# Patient Record
Sex: FEMALE | Race: WHITE | Employment: UNEMPLOYED | URBAN - METROPOLITAN AREA
[De-identification: names, ages, dates, MRNs, and addresses within clinical notes are randomized per-mention and may not be internally consistent; named-entity substitution may affect disease eponyms.]

---

## 2017-02-07 PROBLEM — M54.2 NECK PAIN: Chronic | Status: ACTIVE | Noted: 2017-02-07

## 2017-02-07 PROBLEM — G44.229 CHRONIC TENSION HEADACHE: Chronic | Status: ACTIVE | Noted: 2017-02-07

## 2017-02-07 PROBLEM — F41.9 ANXIETY: Chronic | Status: ACTIVE | Noted: 2017-02-07

## 2017-02-16 ENCOUNTER — HOSPITAL ENCOUNTER (OUTPATIENT)
Dept: PHYSICAL THERAPY | Age: 27
Discharge: HOME | End: 2017-02-16

## 2017-03-14 ENCOUNTER — HOSPITAL ENCOUNTER (OUTPATIENT)
Age: 27
Setting detail: OUTPATIENT SURGERY
End: 2017-03-14
Attending: PAIN MEDICINE | Admitting: PAIN MEDICINE
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
## 2017-05-18 ENCOUNTER — HOSPITAL ENCOUNTER (OUTPATIENT)
Dept: PHYSICAL THERAPY | Age: 27
Discharge: HOME | End: 2017-05-18

## 2022-08-25 ENCOUNTER — APPOINTMENT (RX ONLY)
Dept: URBAN - METROPOLITAN AREA CLINIC 24 | Facility: CLINIC | Age: 32
Setting detail: DERMATOLOGY
End: 2022-08-25

## 2022-08-25 DIAGNOSIS — Z80.8 FAMILY HISTORY OF MALIGNANT NEOPLASM OF OTHER ORGANS OR SYSTEMS: ICD-10-CM

## 2022-08-25 DIAGNOSIS — Z71.89 OTHER SPECIFIED COUNSELING: ICD-10-CM

## 2022-08-25 DIAGNOSIS — D22 MELANOCYTIC NEVI: ICD-10-CM

## 2022-08-25 DIAGNOSIS — L82.1 OTHER SEBORRHEIC KERATOSIS: ICD-10-CM

## 2022-08-25 DIAGNOSIS — Z87.2 PERSONAL HISTORY OF DISEASES OF THE SKIN AND SUBCUTANEOUS TISSUE: ICD-10-CM

## 2022-08-25 DIAGNOSIS — L57.8 OTHER SKIN CHANGES DUE TO CHRONIC EXPOSURE TO NONIONIZING RADIATION: ICD-10-CM

## 2022-08-25 PROBLEM — D22.5 MELANOCYTIC NEVI OF TRUNK: Status: ACTIVE | Noted: 2022-08-25

## 2022-08-25 PROBLEM — D22.72 MELANOCYTIC NEVI OF LEFT LOWER LIMB, INCLUDING HIP: Status: ACTIVE | Noted: 2022-08-25

## 2022-08-25 PROCEDURE — 11301 SHAVE SKIN LESION 0.6-1.0 CM: CPT

## 2022-08-25 PROCEDURE — ? MEDICAL PHOTOGRAPHY REVIEW

## 2022-08-25 PROCEDURE — ? SHAVE REMOVAL

## 2022-08-25 PROCEDURE — 99203 OFFICE O/P NEW LOW 30 MIN: CPT | Mod: 25

## 2022-08-25 PROCEDURE — ? SUNSCREEN RECOMMENDATIONS

## 2022-08-25 PROCEDURE — ? COUNSELING

## 2022-08-25 ASSESSMENT — LOCATION DETAILED DESCRIPTION DERM
LOCATION DETAILED: RIGHT RIB CAGE
LOCATION DETAILED: RIGHT LATERAL BREAST 7-8:00 REGION
LOCATION DETAILED: LEFT ANTERIOR PROXIMAL UPPER ARM
LOCATION DETAILED: LEFT MEDIAL SUPERIOR CHEST
LOCATION DETAILED: LEFT MEDIAL UPPER BACK
LOCATION DETAILED: LEFT POSTERIOR LATERAL DISTAL THIGH

## 2022-08-25 ASSESSMENT — LOCATION ZONE DERM
LOCATION ZONE: TRUNK
LOCATION ZONE: LEG
LOCATION ZONE: ARM

## 2022-08-25 ASSESSMENT — LOCATION SIMPLE DESCRIPTION DERM
LOCATION SIMPLE: LEFT THIGH
LOCATION SIMPLE: LEFT UPPER BACK
LOCATION SIMPLE: LEFT UPPER ARM
LOCATION SIMPLE: CHEST
LOCATION SIMPLE: ABDOMEN
LOCATION SIMPLE: RIGHT BREAST

## 2022-08-25 NOTE — PROCEDURE: MEDICAL PHOTOGRAPHY REVIEW
Detail Level: Detailed
Review Findings: a changing pigmented lesion (addressed during visit)
Number Of Photographs Reviewed: 1

## 2022-08-25 NOTE — PROCEDURE: SHAVE REMOVAL
Accession #: [id number]
Bill For Surgical Tray: no
Anesthesia Type: 1% lidocaine without epinephrine and a 1:12 solution of 8.4% sodium bicarbonate
Post-Care Instructions: I reviewed with the patient in detail post-care instructions. Patient is to keep the biopsy site dry overnight, and then apply bacitracin twice daily until healed. Patient may apply hydrogen peroxide soaks to remove any crusting.
Medical Necessity Clause: This procedure was medically necessary because the lesion that was treated was:
Was A Bandage Applied: Yes
Consent: Verbal consent was obtained from the patient. The risks and benefits to therapy were discussed in detail. Specifically, the risks of infection, scarring, bleeding, prolonged wound healing, incomplete removal, allergy to anesthesia, nerve injury and recurrence were addressed. Prior to the procedure, the treatment site was clearly identified and confirmed by the patient. All components of Universal Protocol/PAUSE Rule completed. Mervat RICE
X Size Of Lesion In Cm (Optional): 0
Anesthesia Volume In Cc: 0.8
Billing Type: Third-Party Bill
Wound Care: Petrolatum
Biopsy Method: Personna blade
Hemostasis: Drysol
Medical Necessity Information: It is in your best interest to select a reason for this procedure from the list below. All of these items fulfill various CMS LCD requirements except the new and changing color options.
Detail Level: Detailed
Notification Instructions: Patient will be notified of pathology results. However, patient instructed to call the office if not contacted within 2 weeks.
Size Of Lesion In Cm (Required): 0.7

## 2023-08-24 ENCOUNTER — APPOINTMENT (RX ONLY)
Dept: URBAN - METROPOLITAN AREA CLINIC 24 | Facility: CLINIC | Age: 33
Setting detail: DERMATOLOGY
End: 2023-08-24

## 2023-08-24 DIAGNOSIS — L57.8 OTHER SKIN CHANGES DUE TO CHRONIC EXPOSURE TO NONIONIZING RADIATION: ICD-10-CM

## 2023-08-24 DIAGNOSIS — D22 MELANOCYTIC NEVI: ICD-10-CM

## 2023-08-24 DIAGNOSIS — Z71.89 OTHER SPECIFIED COUNSELING: ICD-10-CM

## 2023-08-24 DIAGNOSIS — Z87.2 PERSONAL HISTORY OF DISEASES OF THE SKIN AND SUBCUTANEOUS TISSUE: ICD-10-CM

## 2023-08-24 DIAGNOSIS — Z80.8 FAMILY HISTORY OF MALIGNANT NEOPLASM OF OTHER ORGANS OR SYSTEMS: ICD-10-CM

## 2023-08-24 PROBLEM — D22.9 MELANOCYTIC NEVI, UNSPECIFIED: Status: ACTIVE | Noted: 2023-08-24

## 2023-08-24 PROBLEM — D22.5 MELANOCYTIC NEVI OF TRUNK: Status: ACTIVE | Noted: 2023-08-24

## 2023-08-24 PROCEDURE — 11301 SHAVE SKIN LESION 0.6-1.0 CM: CPT

## 2023-08-24 PROCEDURE — ? COUNSELING

## 2023-08-24 PROCEDURE — ? SHAVE REMOVAL

## 2023-08-24 PROCEDURE — 99213 OFFICE O/P EST LOW 20 MIN: CPT | Mod: 25

## 2023-08-24 PROCEDURE — ? SUNSCREEN RECOMMENDATIONS

## 2023-08-24 ASSESSMENT — LOCATION DETAILED DESCRIPTION DERM
LOCATION DETAILED: LEFT POSTERIOR LATERAL DISTAL THIGH
LOCATION DETAILED: STERNAL NOTCH
LOCATION DETAILED: LEFT POSTERIOR SHOULDER
LOCATION DETAILED: LEFT MEDIAL UPPER BACK
LOCATION DETAILED: LEFT ANTERIOR PROXIMAL UPPER ARM
LOCATION DETAILED: PERIUMBILICAL SKIN
LOCATION DETAILED: RIGHT POSTERIOR SHOULDER

## 2023-08-24 ASSESSMENT — LOCATION SIMPLE DESCRIPTION DERM
LOCATION SIMPLE: LEFT UPPER BACK
LOCATION SIMPLE: ABDOMEN
LOCATION SIMPLE: CHEST
LOCATION SIMPLE: LEFT SHOULDER
LOCATION SIMPLE: RIGHT SHOULDER
LOCATION SIMPLE: LEFT UPPER ARM
LOCATION SIMPLE: LEFT THIGH

## 2023-08-24 ASSESSMENT — LOCATION ZONE DERM
LOCATION ZONE: LEG
LOCATION ZONE: TRUNK
LOCATION ZONE: ARM

## 2023-08-24 NOTE — PROCEDURE: SHAVE REMOVAL
Medical Necessity Information: It is in your best interest to select a reason for this procedure from the list below. All of these items fulfill various CMS LCD requirements except the new and changing color options.
Medical Necessity Clause: This procedure was medically necessary because the lesion that was treated was:
Lab: [id number]
Lab Facility: [id number]
Detail Level: Detailed
Was A Bandage Applied: Yes
Size Of Lesion In Cm (Required): 0.6
X Size Of Lesion In Cm (Optional): 0
Depth Of Shave: dermis
Biopsy Method: double edge Personna blade
Anesthesia Type: 1% lidocaine without epinephrine and a 1:10 solution of 8.4% sodium bicarbonate
Anesthesia Volume In Cc: 0.4
Hemostasis: Aluminum Chloride
Wound Care: Petrolatum
Render Path Notes In Note?: No
Consent was obtained from the patient. The risks and benefits to therapy were discussed in detail. Specifically, the risks of infection, scarring, bleeding, prolonged wound healing, incomplete removal, allergy to anesthesia, nerve injury and recurrence were addressed. Prior to the procedure, the treatment site was clearly identified and confirmed by the patient. All components of Universal Protocol/PAUSE Rule completed.
Post-Care Instructions: I reviewed with the patient in detail post-care instructions. Patient is to keep the biopsy site dry overnight, and then apply bacitracin twice daily until healed. Patient may apply hydrogen peroxide soaks to remove any crusting.
Notification Instructions: Patient will be notified of pathology results. However, patient instructed to call the office if not contacted within 2 weeks.
Billing Type: Third-Party Bill

## 2023-09-26 ENCOUNTER — APPOINTMENT (RX ONLY)
Dept: URBAN - METROPOLITAN AREA CLINIC 24 | Facility: CLINIC | Age: 33
Setting detail: DERMATOLOGY
End: 2023-09-26

## 2023-09-26 DIAGNOSIS — D22 MELANOCYTIC NEVI: ICD-10-CM

## 2023-09-26 PROBLEM — D22.5 MELANOCYTIC NEVI OF TRUNK: Status: ACTIVE | Noted: 2023-09-26

## 2023-09-26 PROCEDURE — ? EXCISION

## 2023-09-26 PROCEDURE — 12032 INTMD RPR S/A/T/EXT 2.6-7.5: CPT

## 2023-09-26 PROCEDURE — 11402 EXC TR-EXT B9+MARG 1.1-2 CM: CPT

## 2023-09-26 ASSESSMENT — LOCATION DETAILED DESCRIPTION DERM: LOCATION DETAILED: PERIUMBILICAL SKIN

## 2023-09-26 ASSESSMENT — LOCATION ZONE DERM: LOCATION ZONE: TRUNK

## 2023-09-26 ASSESSMENT — LOCATION SIMPLE DESCRIPTION DERM: LOCATION SIMPLE: ABDOMEN

## 2023-09-26 NOTE — PROCEDURE: EXCISION
Medical Necessity Information: It is in your best interest to select a reason for this procedure from the list below. All of these items fulfill various CMS LCD requirements except lesion extends to a margin.
Include Z78.9 (Other Specified Conditions Influencing Health Status) As An Associated Diagnosis?: No
Medical Necessity Clause: This procedure was medically necessary because the lesion that was treated was:
Lab: [id number]
Lab Facility: [id number]
Size Of Lesion In Cm: 0.7
X Size Of Lesion In Cm (Optional): 0
Size Of Margin In Cm: 0.4
Eye Clamp Note Details: An eye clamp was used during the procedure.
Excision Method: Elliptical
Saucerization Depth: dermis and superficial adipose tissue
Repair Type: Intermediate
Intermediate / Complex Repair - Final Wound Length In Cm: 4.3
Suturegard Retention Suture: 2-0 Nylon
Retention Suture Bite Size: 3 mm
Length To Time In Minutes Device Was In Place: 10
Number Of Hemigard Strips Per Side: 1
Undermining Type: Entire Wound
Debridement Text: The wound edges were debrided prior to proceeding with the closure to facilitate wound healing.
Helical Rim Text: The closure involved the helical rim.
Vermilion Border Text: The closure involved the vermilion border.
Nostril Rim Text: The closure involved the nostril rim.
Retention Suture Text: Retention sutures were placed to support the closure and prevent dehiscence.
Suture Removal: 14 days
Epidermal Closure Graft Donor Site (Optional): simple interrupted
Graft Donor Site Bandage (Optional-Leave Blank If You Don't Want In Note): Steri-strips and a pressure bandage were applied to the donor site.
Detail Level: Detailed
Excision Depth: adipose tissue
Scalpel Size: 15 blade
Anesthesia Type: 1% lidocaine with epinephrine and a 1:10 solution of 8.4% sodium bicarbonate
Additional Anesthesia Volume In Cc: 6
Hemostasis: Electrocautery
Estimated Blood Loss (Cc): minimal
Repair Anesthesia Method: local infiltration
Deep Sutures: 4-0 Vicryl
Epidermal Sutures: 5-0 Prolene
Epidermal Closure: running
Wound Care: Waterproof dressing
Dressing: steri-strips and pressure dressing
Suturegard Intro: Intraoperative tissue expansion was performed, utilizing the SUTUREGARD device, in order to reduce wound tension.
Suturegard Body: The suture ends were repeatedly re-tightened and re-clamped to achieve the desired tissue expansion.
Hemigard Intro: Due to skin fragility and wound tension, it was decided to use HEMIGARD adhesive retention suture devices to permit a linear closure. The skin was cleaned and dried for a 6cm distance away from the wound. Excessive hair, if present, was removed to allow for adhesion.
Hemigard Postcare Instructions: The HEMIGARD strips are to remain completely dry for at least 5-7 days.
Positioning (Leave Blank If You Do Not Want): The patient was placed in a comfortable position exposing the surgical site.
Complex Repair Preamble Text (Leave Blank If You Do Not Want): Extensive wide undermining was performed.
Intermediate Repair Preamble Text (Leave Blank If You Do Not Want): Undermining was performed with blunt dissection.
Fusiform Excision Additional Text (Leave Blank If You Do Not Want): The margin was drawn around the clinically apparent lesion.  A fusiform shape was then drawn on the skin incorporating the lesion and margins.  Incisions were then made along these lines to the appropriate tissue plane and the lesion was extirpated.
Eliptical Excision Additional Text (Leave Blank If You Do Not Want): The margin was drawn around the clinically apparent lesion.  An elliptical shape was then drawn on the skin incorporating the lesion and margins.  Incisions were then made along these lines to the appropriate tissue plane and the lesion was extirpated.
Saucerization Excision Additional Text (Leave Blank If You Do Not Want): The margin was drawn around the clinically apparent lesion.  Incisions were then made along these lines, in a tangential fashion, to the appropriate tissue plane and the lesion was extirpated.
Slit Excision Additional Text (Leave Blank If You Do Not Want): A linear line was drawn on the skin overlying the lesion. An incision was made slowly until the lesion was visualized.  Once visualized, the lesion was removed with blunt dissection.
Excisional Biopsy Additional Text (Leave Blank If You Do Not Want): The margin was drawn around the clinically apparent lesion. An elliptical shape was then drawn on the skin incorporating the lesion and margins.  Incisions were then made along these lines to the appropriate tissue plane and the lesion was extirpated.
Perilesional Excision Additional Text (Leave Blank If You Do Not Want): The margin was drawn around the clinically apparent lesion. Incisions were then made along these lines to the appropriate tissue plane and the lesion was extirpated.
Repair Performed By Another Provider Text (Leave Blank If You Do Not Want): After the tissue was excised the defect was repaired by another provider.
No Repair - Repaired With Adjacent Surgical Defect Text (Leave Blank If You Do Not Want): After the excision the defect was repaired concurrently with another surgical defect which was in close approximation.
Adjacent Tissue Transfer Text: The defect edges were debeveled with a #15 scalpel blade.  Given the location of the defect and the proximity to free margins an adjacent tissue transfer was deemed most appropriate.  Using a sterile surgical marker, an appropriate flap was drawn incorporating the defect and placing the expected incisions within the relaxed skin tension lines where possible.    The area thus outlined was incised deep to adipose tissue with a #15 scalpel blade.  The skin margins were undermined to an appropriate distance in all directions utilizing iris scissors.
Advancement Flap (Single) Text: The defect edges were debeveled with a #15 scalpel blade.  Given the location of the defect and the proximity to free margins a single advancement flap was deemed most appropriate.  Using a sterile surgical marker, an appropriate advancement flap was drawn incorporating the defect and placing the expected incisions within the relaxed skin tension lines where possible.    The area thus outlined was incised deep to adipose tissue with a #15 scalpel blade.  The skin margins were undermined to an appropriate distance in all directions utilizing iris scissors.
Advancement Flap (Double) Text: The defect edges were debeveled with a #15 scalpel blade.  Given the location of the defect and the proximity to free margins a double advancement flap was deemed most appropriate.  Using a sterile surgical marker, the appropriate advancement flaps were drawn incorporating the defect and placing the expected incisions within the relaxed skin tension lines where possible.    The area thus outlined was incised deep to adipose tissue with a #15 scalpel blade.  The skin margins were undermined to an appropriate distance in all directions utilizing iris scissors.
Burow's Advancement Flap Text: The defect edges were debeveled with a #15 scalpel blade.  Given the location of the defect and the proximity to free margins a Burow's advancement flap was deemed most appropriate.  Using a sterile surgical marker, the appropriate advancement flap was drawn incorporating the defect and placing the expected incisions within the relaxed skin tension lines where possible.    The area thus outlined was incised deep to adipose tissue with a #15 scalpel blade.  The skin margins were undermined to an appropriate distance in all directions utilizing iris scissors.
Chonodrocutaneous Helical Advancement Flap Text: The defect edges were debeveled with a #15 scalpel blade.  Given the location of the defect and the proximity to free margins a chondrocutaneous helical advancement flap was deemed most appropriate.  Using a sterile surgical marker, the appropriate advancement flap was drawn incorporating the defect and placing the expected incisions within the relaxed skin tension lines where possible.    The area thus outlined was incised deep to adipose tissue with a #15 scalpel blade.  The skin margins were undermined to an appropriate distance in all directions utilizing iris scissors.
Crescentic Advancement Flap Text: The defect edges were debeveled with a #15 scalpel blade.  Given the location of the defect and the proximity to free margins a crescentic advancement flap was deemed most appropriate.  Using a sterile surgical marker, the appropriate advancement flap was drawn incorporating the defect and placing the expected incisions within the relaxed skin tension lines where possible.    The area thus outlined was incised deep to adipose tissue with a #15 scalpel blade.  The skin margins were undermined to an appropriate distance in all directions utilizing iris scissors.
A-T Advancement Flap Text: The defect edges were debeveled with a #15 scalpel blade.  Given the location of the defect, shape of the defect and the proximity to free margins an A-T advancement flap was deemed most appropriate.  Using a sterile surgical marker, an appropriate advancement flap was drawn incorporating the defect and placing the expected incisions within the relaxed skin tension lines where possible.    The area thus outlined was incised deep to adipose tissue with a #15 scalpel blade.  The skin margins were undermined to an appropriate distance in all directions utilizing iris scissors.
O-T Advancement Flap Text: The defect edges were debeveled with a #15 scalpel blade.  Given the location of the defect, shape of the defect and the proximity to free margins an O-T advancement flap was deemed most appropriate.  Using a sterile surgical marker, an appropriate advancement flap was drawn incorporating the defect and placing the expected incisions within the relaxed skin tension lines where possible.    The area thus outlined was incised deep to adipose tissue with a #15 scalpel blade.  The skin margins were undermined to an appropriate distance in all directions utilizing iris scissors.
O-L Flap Text: The defect edges were debeveled with a #15 scalpel blade.  Given the location of the defect, shape of the defect and the proximity to free margins an O-L flap was deemed most appropriate.  Using a sterile surgical marker, an appropriate advancement flap was drawn incorporating the defect and placing the expected incisions within the relaxed skin tension lines where possible.    The area thus outlined was incised deep to adipose tissue with a #15 scalpel blade.  The skin margins were undermined to an appropriate distance in all directions utilizing iris scissors.
O-Z Flap Text: The defect edges were debeveled with a #15 scalpel blade.  Given the location of the defect, shape of the defect and the proximity to free margins an O-Z flap was deemed most appropriate.  Using a sterile surgical marker, an appropriate transposition flap was drawn incorporating the defect and placing the expected incisions within the relaxed skin tension lines where possible. The area thus outlined was incised deep to adipose tissue with a #15 scalpel blade.  The skin margins were undermined to an appropriate distance in all directions utilizing iris scissors.
Double O-Z Flap Text: The defect edges were debeveled with a #15 scalpel blade.  Given the location of the defect, shape of the defect and the proximity to free margins a Double O-Z flap was deemed most appropriate.  Using a sterile surgical marker, an appropriate transposition flap was drawn incorporating the defect and placing the expected incisions within the relaxed skin tension lines where possible. The area thus outlined was incised deep to adipose tissue with a #15 scalpel blade.  The skin margins were undermined to an appropriate distance in all directions utilizing iris scissors.
V-Y Flap Text: The defect edges were debeveled with a #15 scalpel blade.  Given the location of the defect, shape of the defect and the proximity to free margins a V-Y flap was deemed most appropriate.  Using a sterile surgical marker, an appropriate advancement flap was drawn incorporating the defect and placing the expected incisions within the relaxed skin tension lines where possible.    The area thus outlined was incised deep to adipose tissue with a #15 scalpel blade.  The skin margins were undermined to an appropriate distance in all directions utilizing iris scissors.
Advancement-Rotation Flap Text: The defect edges were debeveled with a #15 scalpel blade.  Given the location of the defect, shape of the defect and the proximity to free margins an advancement-rotation flap was deemed most appropriate.  Using a sterile surgical marker, an appropriate flap was drawn incorporating the defect and placing the expected incisions within the relaxed skin tension lines where possible. The area thus outlined was incised deep to adipose tissue with a #15 scalpel blade.  The skin margins were undermined to an appropriate distance in all directions utilizing iris scissors.
Mercedes Flap Text: The defect edges were debeveled with a #15 scalpel blade.  Given the location of the defect, shape of the defect and the proximity to free margins a Mercedes flap was deemed most appropriate.  Using a sterile surgical marker, an appropriate advancement flap was drawn incorporating the defect and placing the expected incisions within the relaxed skin tension lines where possible. The area thus outlined was incised deep to adipose tissue with a #15 scalpel blade.  The skin margins were undermined to an appropriate distance in all directions utilizing iris scissors.
Modified Advancement Flap Text: The defect edges were debeveled with a #15 scalpel blade.  Given the location of the defect, shape of the defect and the proximity to free margins a modified advancement flap was deemed most appropriate.  Using a sterile surgical marker, an appropriate advancement flap was drawn incorporating the defect and placing the expected incisions within the relaxed skin tension lines where possible.    The area thus outlined was incised deep to adipose tissue with a #15 scalpel blade.  The skin margins were undermined to an appropriate distance in all directions utilizing iris scissors.
Mucosal Advancement Flap Text: Given the location of the defect, shape of the defect and the proximity to free margins a mucosal advancement flap was deemed most appropriate. Incisions were made with a 15 blade scalpel in the appropriate fashion along the cutaneous vermilion border and the mucosal lip. The remaining actinically damaged mucosal tissue was excised.  The mucosal advancement flap was then elevated to the gingival sulcus with care taken to preserve the neurovascular structures and advanced into the primary defect. Care was taken to ensure that precise realignment of the vermilion border was achieved.
Peng Advancement Flap Text: The defect edges were debeveled with a #15 scalpel blade.  Given the location of the defect, shape of the defect and the proximity to free margins a Peng advancement flap was deemed most appropriate.  Using a sterile surgical marker, an appropriate advancement flap was drawn incorporating the defect and placing the expected incisions within the relaxed skin tension lines where possible. The area thus outlined was incised deep to adipose tissue with a #15 scalpel blade.  The skin margins were undermined to an appropriate distance in all directions utilizing iris scissors.
Hatchet Flap Text: The defect edges were debeveled with a #15 scalpel blade.  Given the location of the defect, shape of the defect and the proximity to free margins a hatchet flap was deemed most appropriate.  Using a sterile surgical marker, an appropriate hatchet flap was drawn incorporating the defect and placing the expected incisions within the relaxed skin tension lines where possible.    The area thus outlined was incised deep to adipose tissue with a #15 scalpel blade.  The skin margins were undermined to an appropriate distance in all directions utilizing iris scissors.
Rotation Flap Text: The defect edges were debeveled with a #15 scalpel blade.  Given the location of the defect, shape of the defect and the proximity to free margins a rotation flap was deemed most appropriate.  Using a sterile surgical marker, an appropriate rotation flap was drawn incorporating the defect and placing the expected incisions within the relaxed skin tension lines where possible.    The area thus outlined was incised deep to adipose tissue with a #15 scalpel blade.  The skin margins were undermined to an appropriate distance in all directions utilizing iris scissors.
Bilateral Rotation Flap Text: The defect edges were debeveled with a #15 scalpel blade. Given the location of the defect, shape of the defect and the proximity to free margins a bilateral rotation flap was deemed most appropriate. Using a sterile surgical marker, an appropriate rotation flap was drawn incorporating the defect and placing the expected incisions within the relaxed skin tension lines where possible. The area thus outlined was incised deep to adipose tissue with a #15 scalpel blade. The skin margins were undermined to an appropriate distance in all directions utilizing iris scissors. Following this, the designed flap was carried over into the primary defect and sutured into place.
Spiral Flap Text: The defect edges were debeveled with a #15 scalpel blade.  Given the location of the defect, shape of the defect and the proximity to free margins a spiral flap was deemed most appropriate.  Using a sterile surgical marker, an appropriate rotation flap was drawn incorporating the defect and placing the expected incisions within the relaxed skin tension lines where possible. The area thus outlined was incised deep to adipose tissue with a #15 scalpel blade.  The skin margins were undermined to an appropriate distance in all directions utilizing iris scissors.
Staged Advancement Flap Text: The defect edges were debeveled with a #15 scalpel blade.  Given the location of the defect, shape of the defect and the proximity to free margins a staged advancement flap was deemed most appropriate.  Using a sterile surgical marker, an appropriate advancement flap was drawn incorporating the defect and placing the expected incisions within the relaxed skin tension lines where possible. The area thus outlined was incised deep to adipose tissue with a #15 scalpel blade.  The skin margins were undermined to an appropriate distance in all directions utilizing iris scissors.
Star Wedge Flap Text: The defect edges were debeveled with a #15 scalpel blade.  Given the location of the defect, shape of the defect and the proximity to free margins a star wedge flap was deemed most appropriate.  Using a sterile surgical marker, an appropriate rotation flap was drawn incorporating the defect and placing the expected incisions within the relaxed skin tension lines where possible. The area thus outlined was incised deep to adipose tissue with a #15 scalpel blade.  The skin margins were undermined to an appropriate distance in all directions utilizing iris scissors.
Transposition Flap Text: The defect edges were debeveled with a #15 scalpel blade.  Given the location of the defect and the proximity to free margins a transposition flap was deemed most appropriate.  Using a sterile surgical marker, an appropriate transposition flap was drawn incorporating the defect.    The area thus outlined was incised deep to adipose tissue with a #15 scalpel blade.  The skin margins were undermined to an appropriate distance in all directions utilizing iris scissors.
Muscle Hinge Flap Text: The defect edges were debeveled with a #15 scalpel blade.  Given the size, depth and location of the defect and the proximity to free margins a muscle hinge flap was deemed most appropriate.  Using a sterile surgical marker, an appropriate hinge flap was drawn incorporating the defect. The area thus outlined was incised with a #15 scalpel blade.  The skin margins were undermined to an appropriate distance in all directions utilizing iris scissors.
Mustarde Flap Text: The defect edges were debeveled with a #15 scalpel blade.  Given the size, depth and location of the defect and the proximity to free margins a Mustarde flap was deemed most appropriate.  Using a sterile surgical marker, an appropriate flap was drawn incorporating the defect. The area thus outlined was incised with a #15 scalpel blade.  The skin margins were undermined to an appropriate distance in all directions utilizing iris scissors.
Nasal Turnover Hinge Flap Text: The defect edges were debeveled with a #15 scalpel blade.  Given the size, depth, location of the defect and the defect being full thickness a nasal turnover hinge flap was deemed most appropriate.  Using a sterile surgical marker, an appropriate hinge flap was drawn incorporating the defect. The area thus outlined was incised with a #15 scalpel blade. The flap was designed to recreate the nasal mucosal lining and the alar rim. The skin margins were undermined to an appropriate distance in all directions utilizing iris scissors.
Nasalis-Muscle-Based Myocutaneous Island Pedicle Flap Text: Using a #15 blade, an incision was made around the donor flap to the level of the nasalis muscle. Wide lateral undermining was then performed in both the subcutaneous plane above the nasalis muscle, and in a submuscular plane just above periosteum. This allowed the formation of a free nasalis muscle axial pedicle (based on the angular artery) which was still attached to the actual cutaneous flap, increasing its mobility and vascular viability. Hemostasis was obtained with pinpoint electrocoagulation. The flap was mobilized into position and the pivotal anchor points positioned and stabilized with buried interrupted sutures. Subcutaneous and dermal tissues were closed in a multilayered fashion with sutures. Tissue redundancies were excised, and the epidermal edges were apposed without significant tension and sutured with sutures.
Orbicularis Oris Muscle Flap Text: The defect edges were debeveled with a #15 scalpel blade.  Given that the defect affected the competency of the oral sphincter an orbicularis oris muscle flap was deemed most appropriate to restore this competency and normal muscle function.  Using a sterile surgical marker, an appropriate flap was drawn incorporating the defect. The area thus outlined was incised with a #15 scalpel blade.
Melolabial Transposition Flap Text: The defect edges were debeveled with a #15 scalpel blade.  Given the location of the defect and the proximity to free margins a melolabial flap was deemed most appropriate.  Using a sterile surgical marker, an appropriate melolabial transposition flap was drawn incorporating the defect.    The area thus outlined was incised deep to adipose tissue with a #15 scalpel blade.  The skin margins were undermined to an appropriate distance in all directions utilizing iris scissors.
Rhombic Flap Text: The defect edges were debeveled with a #15 scalpel blade.  Given the location of the defect and the proximity to free margins a rhombic flap was deemed most appropriate.  Using a sterile surgical marker, an appropriate rhombic flap was drawn incorporating the defect.    The area thus outlined was incised deep to adipose tissue with a #15 scalpel blade.  The skin margins were undermined to an appropriate distance in all directions utilizing iris scissors.
Rhomboid Transposition Flap Text: The defect edges were debeveled with a #15 scalpel blade.  Given the location of the defect and the proximity to free margins a rhomboid transposition flap was deemed most appropriate.  Using a sterile surgical marker, an appropriate rhomboid flap was drawn incorporating the defect.    The area thus outlined was incised deep to adipose tissue with a #15 scalpel blade.  The skin margins were undermined to an appropriate distance in all directions utilizing iris scissors.
Bi-Rhombic Flap Text: The defect edges were debeveled with a #15 scalpel blade.  Given the location of the defect and the proximity to free margins a bi-rhombic flap was deemed most appropriate.  Using a sterile surgical marker, an appropriate rhombic flap was drawn incorporating the defect. The area thus outlined was incised deep to adipose tissue with a #15 scalpel blade.  The skin margins were undermined to an appropriate distance in all directions utilizing iris scissors.
Helical Rim Advancement Flap Text: The defect edges were debeveled with a #15 blade scalpel.  Given the location of the defect and the proximity to free margins (helical rim) a double helical rim advancement flap was deemed most appropriate.  Using a sterile surgical marker, the appropriate advancement flaps were drawn incorporating the defect and placing the expected incisions between the helical rim and antihelix where possible.  The area thus outlined was incised through and through with a #15 scalpel blade.  With a skin hook and iris scissors, the flaps were gently and sharply undermined and freed up.
Bilateral Helical Rim Advancement Flap Text: The defect edges were debeveled with a #15 blade scalpel.  Given the location of the defect and the proximity to free margins (helical rim) a bilateral helical rim advancement flap was deemed most appropriate.  Using a sterile surgical marker, the appropriate advancement flaps were drawn incorporating the defect and placing the expected incisions between the helical rim and antihelix where possible.  The area thus outlined was incised through and through with a #15 scalpel blade.  With a skin hook and iris scissors, the flaps were gently and sharply undermined and freed up.
Ear Star Wedge Flap Text: The defect edges were debeveled with a #15 blade scalpel.  Given the location of the defect and the proximity to free margins (helical rim) an ear star wedge flap was deemed most appropriate.  Using a sterile surgical marker, the appropriate flap was drawn incorporating the defect and placing the expected incisions between the helical rim and antihelix where possible.  The area thus outlined was incised through and through with a #15 scalpel blade.
Banner Transposition Flap Text: The defect edges were debeveled with a #15 scalpel blade.  Given the location of the defect and the proximity to free margins a Banner transposition flap was deemed most appropriate.  Using a sterile surgical marker, an appropriate flap drawn around the defect. The area thus outlined was incised deep to adipose tissue with a #15 scalpel blade.  The skin margins were undermined to an appropriate distance in all directions utilizing iris scissors.
Bilobed Flap Text: The defect edges were debeveled with a #15 scalpel blade.  Given the location of the defect and the proximity to free margins a bilobe flap was deemed most appropriate.  Using a sterile surgical marker, an appropriate bilobe flap drawn around the defect.    The area thus outlined was incised deep to adipose tissue with a #15 scalpel blade.  The skin margins were undermined to an appropriate distance in all directions utilizing iris scissors.
Bilobed Transposition Flap Text: The defect edges were debeveled with a #15 scalpel blade.  Given the location of the defect and the proximity to free margins a bilobed transposition flap was deemed most appropriate.  Using a sterile surgical marker, an appropriate bilobe flap drawn around the defect.    The area thus outlined was incised deep to adipose tissue with a #15 scalpel blade.  The skin margins were undermined to an appropriate distance in all directions utilizing iris scissors.
Trilobed Flap Text: The defect edges were debeveled with a #15 scalpel blade.  Given the location of the defect and the proximity to free margins a trilobed flap was deemed most appropriate.  Using a sterile surgical marker, an appropriate trilobed flap drawn around the defect.    The area thus outlined was incised deep to adipose tissue with a #15 scalpel blade.  The skin margins were undermined to an appropriate distance in all directions utilizing iris scissors.
Dorsal Nasal Flap Text: The defect edges were debeveled with a #15 scalpel blade.  Given the location of the defect and the proximity to free margins a dorsal nasal flap was deemed most appropriate.  Using a sterile surgical marker, an appropriate dorsal nasal flap was drawn around the defect.    The area thus outlined was incised deep to adipose tissue with a #15 scalpel blade.  The skin margins were undermined to an appropriate distance in all directions utilizing iris scissors.
Island Pedicle Flap Text: The defect edges were debeveled with a #15 scalpel blade.  Given the location of the defect, shape of the defect and the proximity to free margins an island pedicle advancement flap was deemed most appropriate.  Using a sterile surgical marker, an appropriate advancement flap was drawn incorporating the defect, outlining the appropriate donor tissue and placing the expected incisions within the relaxed skin tension lines where possible.    The area thus outlined was incised deep to adipose tissue with a #15 scalpel blade.  The skin margins were undermined to an appropriate distance in all directions around the primary defect and laterally outward around the island pedicle utilizing iris scissors.  There was minimal undermining beneath the pedicle flap.
Island Pedicle Flap With Canthal Suspension Text: The defect edges were debeveled with a #15 scalpel blade.  Given the location of the defect, shape of the defect and the proximity to free margins an island pedicle advancement flap was deemed most appropriate.  Using a sterile surgical marker, an appropriate advancement flap was drawn incorporating the defect, outlining the appropriate donor tissue and placing the expected incisions within the relaxed skin tension lines where possible. The area thus outlined was incised deep to adipose tissue with a #15 scalpel blade.  The skin margins were undermined to an appropriate distance in all directions around the primary defect and laterally outward around the island pedicle utilizing iris scissors.  There was minimal undermining beneath the pedicle flap. A suspension suture was placed in the canthal tendon to prevent tension and prevent ectropion.
Alar Island Pedicle Flap Text: The defect edges were debeveled with a #15 scalpel blade.  Given the location of the defect, shape of the defect and the proximity to the alar rim an island pedicle advancement flap was deemed most appropriate.  Using a sterile surgical marker, an appropriate advancement flap was drawn incorporating the defect, outlining the appropriate donor tissue and placing the expected incisions within the nasal ala running parallel to the alar rim. The area thus outlined was incised with a #15 scalpel blade.  The skin margins were undermined minimally to an appropriate distance in all directions around the primary defect and laterally outward around the island pedicle utilizing iris scissors.  There was minimal undermining beneath the pedicle flap.
Double Island Pedicle Flap Text: The defect edges were debeveled with a #15 scalpel blade.  Given the location of the defect, shape of the defect and the proximity to free margins a double island pedicle advancement flap was deemed most appropriate.  Using a sterile surgical marker, an appropriate advancement flap was drawn incorporating the defect, outlining the appropriate donor tissue and placing the expected incisions within the relaxed skin tension lines where possible.    The area thus outlined was incised deep to adipose tissue with a #15 scalpel blade.  The skin margins were undermined to an appropriate distance in all directions around the primary defect and laterally outward around the island pedicle utilizing iris scissors.  There was minimal undermining beneath the pedicle flap.
Island Pedicle Flap-Requiring Vessel Identification Text: The defect edges were debeveled with a #15 scalpel blade.  Given the location of the defect, shape of the defect and the proximity to free margins an island pedicle advancement flap was deemed most appropriate.  Using a sterile surgical marker, an appropriate advancement flap was drawn, based on the axial vessel mentioned above, incorporating the defect, outlining the appropriate donor tissue and placing the expected incisions within the relaxed skin tension lines where possible.    The area thus outlined was incised deep to adipose tissue with a #15 scalpel blade.  The skin margins were undermined to an appropriate distance in all directions around the primary defect and laterally outward around the island pedicle utilizing iris scissors.  There was minimal undermining beneath the pedicle flap.
Keystone Flap Text: The defect edges were debeveled with a #15 scalpel blade.  Given the location of the defect, shape of the defect a keystone flap was deemed most appropriate.  Using a sterile surgical marker, an appropriate keystone flap was drawn incorporating the defect, outlining the appropriate donor tissue and placing the expected incisions within the relaxed skin tension lines where possible. The area thus outlined was incised deep to adipose tissue with a #15 scalpel blade.  The skin margins were undermined to an appropriate distance in all directions around the primary defect and laterally outward around the flap utilizing iris scissors.
O-T Plasty Text: The defect edges were debeveled with a #15 scalpel blade.  Given the location of the defect, shape of the defect and the proximity to free margins an O-T plasty was deemed most appropriate.  Using a sterile surgical marker, an appropriate O-T plasty was drawn incorporating the defect and placing the expected incisions within the relaxed skin tension lines where possible.    The area thus outlined was incised deep to adipose tissue with a #15 scalpel blade.  The skin margins were undermined to an appropriate distance in all directions utilizing iris scissors.
O-Z Plasty Text: The defect edges were debeveled with a #15 scalpel blade.  Given the location of the defect, shape of the defect and the proximity to free margins an O-Z plasty (double transposition flap) was deemed most appropriate.  Using a sterile surgical marker, the appropriate transposition flaps were drawn incorporating the defect and placing the expected incisions within the relaxed skin tension lines where possible.    The area thus outlined was incised deep to adipose tissue with a #15 scalpel blade.  The skin margins were undermined to an appropriate distance in all directions utilizing iris scissors.  Hemostasis was achieved with electrocautery.  The flaps were then transposed into place, one clockwise and the other counterclockwise, and anchored with interrupted buried subcutaneous sutures.
Double O-Z Plasty Text: The defect edges were debeveled with a #15 scalpel blade.  Given the location of the defect, shape of the defect and the proximity to free margins a Double O-Z plasty (double transposition flap) was deemed most appropriate.  Using a sterile surgical marker, the appropriate transposition flaps were drawn incorporating the defect and placing the expected incisions within the relaxed skin tension lines where possible. The area thus outlined was incised deep to adipose tissue with a #15 scalpel blade.  The skin margins were undermined to an appropriate distance in all directions utilizing iris scissors.  Hemostasis was achieved with electrocautery.  The flaps were then transposed into place, one clockwise and the other counterclockwise, and anchored with interrupted buried subcutaneous sutures.
V-Y Plasty Text: The defect edges were debeveled with a #15 scalpel blade.  Given the location of the defect, shape of the defect and the proximity to free margins an V-Y advancement flap was deemed most appropriate.  Using a sterile surgical marker, an appropriate advancement flap was drawn incorporating the defect and placing the expected incisions within the relaxed skin tension lines where possible.    The area thus outlined was incised deep to adipose tissue with a #15 scalpel blade.  The skin margins were undermined to an appropriate distance in all directions utilizing iris scissors.
H Plasty Text: Given the location of the defect, shape of the defect and the proximity to free margins a H-plasty was deemed most appropriate for repair.  Using a sterile surgical marker, the appropriate advancement arms of the H-plasty were drawn incorporating the defect and placing the expected incisions within the relaxed skin tension lines where possible. The area thus outlined was incised deep to adipose tissue with a #15 scalpel blade. The skin margins were undermined to an appropriate distance in all directions utilizing iris scissors.  The opposing advancement arms were then advanced into place in opposite direction and anchored with interrupted buried subcutaneous sutures.
W Plasty Text: The lesion was extirpated to the level of the fat with a #15 scalpel blade.  Given the location of the defect, shape of the defect and the proximity to free margins a W-plasty was deemed most appropriate for repair.  Using a sterile surgical marker, the appropriate transposition arms of the W-plasty were drawn incorporating the defect and placing the expected incisions within the relaxed skin tension lines where possible.    The area thus outlined was incised deep to adipose tissue with a #15 scalpel blade.  The skin margins were undermined to an appropriate distance in all directions utilizing iris scissors.  The opposing transposition arms were then transposed into place in opposite direction and anchored with interrupted buried subcutaneous sutures.
Z Plasty Text: The lesion was extirpated to the level of the fat with a #15 scalpel blade.  Given the location of the defect, shape of the defect and the proximity to free margins a Z-plasty was deemed most appropriate for repair.  Using a sterile surgical marker, the appropriate transposition arms of the Z-plasty were drawn incorporating the defect and placing the expected incisions within the relaxed skin tension lines where possible.    The area thus outlined was incised deep to adipose tissue with a #15 scalpel blade.  The skin margins were undermined to an appropriate distance in all directions utilizing iris scissors.  The opposing transposition arms were then transposed into place in opposite direction and anchored with interrupted buried subcutaneous sutures.
Double Z Plasty Text: The lesion was extirpated to the level of the fat with a #15 scalpel blade. Given the location of the defect, shape of the defect and the proximity to free margins a double Z-plasty was deemed most appropriate for repair. Using a sterile surgical marker, the appropriate transposition arms of the double Z-plasty were drawn incorporating the defect and placing the expected incisions within the relaxed skin tension lines where possible. The area thus outlined was incised deep to adipose tissue with a #15 scalpel blade. The skin margins were undermined to an appropriate distance in all directions utilizing iris scissors. The opposing transposition arms were then transposed and carried over into place in opposite direction and anchored with interrupted buried subcutaneous sutures.
Zygomaticofacial Flap Text: Given the location of the defect, shape of the defect and the proximity to free margins a zygomaticofacial flap was deemed most appropriate for repair.  Using a sterile surgical marker, the appropriate flap was drawn incorporating the defect and placing the expected incisions within the relaxed skin tension lines where possible. The area thus outlined was incised deep to adipose tissue with a #15 scalpel blade with preservation of a vascular pedicle.  The skin margins were undermined to an appropriate distance in all directions utilizing iris scissors.  The flap was then placed into the defect and anchored with interrupted buried subcutaneous sutures.
Cheek Interpolation Flap Text: A decision was made to reconstruct the defect utilizing an interpolation axial flap and a staged reconstruction.  A telfa template was made of the defect.  This telfa template was then used to outline the Cheek Interpolation flap.  The donor area for the pedicle flap was then injected with anesthesia.  The flap was excised through the skin and subcutaneous tissue down to the layer of the underlying musculature.  The interpolation flap was carefully excised within this deep plane to maintain its blood supply.  The edges of the donor site were undermined.   The donor site was closed in a primary fashion.  The pedicle was then rotated into position and sutured.  Once the tube was sutured into place, adequate blood supply was confirmed with blanching and refill.  The pedicle was then wrapped with xeroform gauze and dressed appropriately with a telfa and gauze bandage to ensure continued blood supply and protect the attached pedicle.
Cheek-To-Nose Interpolation Flap Text: A decision was made to reconstruct the defect utilizing an interpolation axial flap and a staged reconstruction.  A telfa template was made of the defect.  This telfa template was then used to outline the Cheek-To-Nose Interpolation flap.  The donor area for the pedicle flap was then injected with anesthesia.  The flap was excised through the skin and subcutaneous tissue down to the layer of the underlying musculature.  The interpolation flap was carefully excised within this deep plane to maintain its blood supply.  The edges of the donor site were undermined.   The donor site was closed in a primary fashion.  The pedicle was then rotated into position and sutured.  Once the tube was sutured into place, adequate blood supply was confirmed with blanching and refill.  The pedicle was then wrapped with xeroform gauze and dressed appropriately with a telfa and gauze bandage to ensure continued blood supply and protect the attached pedicle.
Interpolation Flap Text: A decision was made to reconstruct the defect utilizing an interpolation axial flap and a staged reconstruction.  A telfa template was made of the defect.  This telfa template was then used to outline the interpolation flap.  The donor area for the pedicle flap was then injected with anesthesia.  The flap was excised through the skin and subcutaneous tissue down to the layer of the underlying musculature.  The interpolation flap was carefully excised within this deep plane to maintain its blood supply.  The edges of the donor site were undermined.   The donor site was closed in a primary fashion.  The pedicle was then rotated into position and sutured.  Once the tube was sutured into place, adequate blood supply was confirmed with blanching and refill.  The pedicle was then wrapped with xeroform gauze and dressed appropriately with a telfa and gauze bandage to ensure continued blood supply and protect the attached pedicle.
Melolabial Interpolation Flap Text: A decision was made to reconstruct the defect utilizing an interpolation axial flap and a staged reconstruction.  A telfa template was made of the defect.  This telfa template was then used to outline the melolabial interpolation flap.  The donor area for the pedicle flap was then injected with anesthesia.  The flap was excised through the skin and subcutaneous tissue down to the layer of the underlying musculature.  The pedicle flap was carefully excised within this deep plane to maintain its blood supply.  The edges of the donor site were undermined.   The donor site was closed in a primary fashion.  The pedicle was then rotated into position and sutured.  Once the tube was sutured into place, adequate blood supply was confirmed with blanching and refill.  The pedicle was then wrapped with xeroform gauze and dressed appropriately with a telfa and gauze bandage to ensure continued blood supply and protect the attached pedicle.
Mastoid Interpolation Flap Text: A decision was made to reconstruct the defect utilizing an interpolation axial flap and a staged reconstruction.  A telfa template was made of the defect.  This telfa template was then used to outline the mastoid interpolation flap.  The donor area for the pedicle flap was then injected with anesthesia.  The flap was excised through the skin and subcutaneous tissue down to the layer of the underlying musculature.  The pedicle flap was carefully excised within this deep plane to maintain its blood supply.  The edges of the donor site were undermined.   The donor site was closed in a primary fashion.  The pedicle was then rotated into position and sutured.  Once the tube was sutured into place, adequate blood supply was confirmed with blanching and refill.  The pedicle was then wrapped with xeroform gauze and dressed appropriately with a telfa and gauze bandage to ensure continued blood supply and protect the attached pedicle.
Posterior Auricular Interpolation Flap Text: A decision was made to reconstruct the defect utilizing an interpolation axial flap and a staged reconstruction.  A telfa template was made of the defect.  This telfa template was then used to outline the posterior auricular interpolation flap.  The donor area for the pedicle flap was then injected with anesthesia.  The flap was excised through the skin and subcutaneous tissue down to the layer of the underlying musculature.  The pedicle flap was carefully excised within this deep plane to maintain its blood supply.  The edges of the donor site were undermined.   The donor site was closed in a primary fashion.  The pedicle was then rotated into position and sutured.  Once the tube was sutured into place, adequate blood supply was confirmed with blanching and refill.  The pedicle was then wrapped with xeroform gauze and dressed appropriately with a telfa and gauze bandage to ensure continued blood supply and protect the attached pedicle.
Paramedian Forehead Flap Text: A decision was made to reconstruct the defect utilizing an interpolation axial flap and a staged reconstruction.  A telfa template was made of the defect.  This telfa template was then used to outline the paramedian forehead pedicle flap.  The donor area for the pedicle flap was then injected with anesthesia.  The flap was excised through the skin and subcutaneous tissue down to the layer of the underlying musculature.  The pedicle flap was carefully excised within this deep plane to maintain its blood supply.  The edges of the donor site were undermined.   The donor site was closed in a primary fashion.  The pedicle was then rotated into position and sutured.  Once the tube was sutured into place, adequate blood supply was confirmed with blanching and refill.  The pedicle was then wrapped with xeroform gauze and dressed appropriately with a telfa and gauze bandage to ensure continued blood supply and protect the attached pedicle.
Abbe Flap (Upper To Lower Lip) Text: The defect of the lower lip was assessed and measured.  Given the location and size of the defect, an Abbe flap was deemed most appropriate.  Using a sterile surgical marker, an appropriate Abbe flap was measured and drawn on the upper lip. Local anesthesia was then infiltrated.  A scalpel was then used to incise the upper lip through and through the skin, vermilion, muscle and mucosa, leaving the flap pedicled on the opposite side.  The flap was then rotated and transferred to the lower lip defect.  The flap was then sutured into place with a three layer technique, closing the orbicularis oris muscle layer with subcutaneous buried sutures, followed by a mucosal layer and an epidermal layer.
Abbe Flap (Lower To Upper Lip) Text: The defect of the upper lip was assessed and measured.  Given the location and size of the defect, an Abbe flap was deemed most appropriate.  Using a sterile surgical marker, an appropriate Abbe flap was measured and drawn on the lower lip. Local anesthesia was then infiltrated. A scalpel was then used to incise the upper lip through and through the skin, vermilion, muscle and mucosa, leaving the flap pedicled on the opposite side.  The flap was then rotated and transferred to the lower lip defect.  The flap was then sutured into place with a three layer technique, closing the orbicularis oris muscle layer with subcutaneous buried sutures, followed by a mucosal layer and an epidermal layer.
Estlander Flap (Upper To Lower Lip) Text: The defect of the lower lip was assessed and measured.  Given the location and size of the defect, an Estlander flap was deemed most appropriate.  Using a sterile surgical marker, an appropriate Estlander flap was measured and drawn on the upper lip. Local anesthesia was then infiltrated. A scalpel was then used to incise the lateral aspect of the flap, through skin, muscle and mucosa, leaving the flap pedicled medially.  The flap was then rotated and positioned to fill the lower lip defect.  The flap was then sutured into place with a three layer technique, closing the orbicularis oris muscle layer with subcutaneous buried sutures, followed by a mucosal layer and an epidermal layer.
Lip Wedge Excision Repair Text: Given the location of the defect and the proximity to free margins a full thickness wedge repair was deemed most appropriate.  Using a sterile surgical marker, the appropriate repair was drawn incorporating the defect and placing the expected incisions perpendicular to the vermilion border.  The vermilion border was also meticulously outlined to ensure appropriate reapproximation during the repair.  The area thus outlined was incised through and through with a #15 scalpel blade.  The muscularis and dermis were reaproximated with deep sutures following hemostasis. Care was taken to realign the vermilion border before proceeding with the superficial closure.  Once the vermilion was realigned the superfical and mucosal closure was finished.
Ftsg Text: The defect edges were debeveled with a #15 scalpel blade.  Given the location of the defect, shape of the defect and the proximity to free margins a full thickness skin graft was deemed most appropriate.  Using a sterile surgical marker, the primary defect shape was transferred to the donor site. The area thus outlined was incised deep to adipose tissue with a #15 scalpel blade.  The harvested graft was then trimmed of adipose tissue until only dermis and epidermis was left.  The skin margins of the secondary defect were undermined to an appropriate distance in all directions utilizing iris scissors.  The secondary defect was closed with interrupted buried subcutaneous sutures.  The skin edges were then re-apposed with running  sutures.  The skin graft was then placed in the primary defect and oriented appropriately.
Split-Thickness Skin Graft Text: The defect edges were debeveled with a #15 scalpel blade.  Given the location of the defect, shape of the defect and the proximity to free margins a split thickness skin graft was deemed most appropriate.  Using a sterile surgical marker, the primary defect shape was transferred to the donor site. The split thickness graft was then harvested.  The skin graft was then placed in the primary defect and oriented appropriately.
Pinch Graft Text: The defect edges were debeveled with a #15 scalpel blade. Given the location of the defect, shape of the defect and the proximity to free margins a pinch graft was deemed most appropriate. Using a sterile surgical marker, the primary defect shape was transferred to the donor site. The area thus outlined was incised deep to adipose tissue with a #15 scalpel blade.  The harvested graft was then trimmed of adipose tissue until only dermis and epidermis was left. The skin margins of the secondary defect were undermined to an appropriate distance in all directions utilizing iris scissors.  The secondary defect was closed with interrupted buried subcutaneous sutures.  The skin edges were then re-apposed with running  sutures.  The skin graft was then placed in the primary defect and oriented appropriately.
Burow's Graft Text: The defect edges were debeveled with a #15 scalpel blade.  Given the location of the defect, shape of the defect, the proximity to free margins and the presence of a standing cone deformity a Burow's skin graft was deemed most appropriate. The standing cone was removed and this tissue was then trimmed to the shape of the primary defect. The adipose tissue was also removed until only dermis and epidermis were left.  The skin margins of the secondary defect were undermined to an appropriate distance in all directions utilizing iris scissors.  The secondary defect was closed with interrupted buried subcutaneous sutures.  The skin edges were then re-apposed with running  sutures.  The skin graft was then placed in the primary defect and oriented appropriately.
Cartilage Graft Text: The defect edges were debeveled with a #15 scalpel blade.  Given the location of the defect, shape of the defect, the fact the defect involved a full thickness cartilage defect a cartilage graft was deemed most appropriate.  An appropriate donor site was identified, cleansed, and anesthetized. The cartilage graft was then harvested and transferred to the recipient site, oriented appropriately and then sutured into place.  The secondary defect was then repaired using a primary closure.
Composite Graft Text: The defect edges were debeveled with a #15 scalpel blade.  Given the location of the defect, shape of the defect, the proximity to free margins and the fact the defect was full thickness a composite graft was deemed most appropriate.  The defect was outline and then transferred to the donor site.  A full thickness graft was then excised from the donor site. The graft was then placed in the primary defect, oriented appropriately and then sutured into place.  The secondary defect was then repaired using a primary closure.
Epidermal Autograft Text: The defect edges were debeveled with a #15 scalpel blade.  Given the location of the defect, shape of the defect and the proximity to free margins an epidermal autograft was deemed most appropriate.  Using a sterile surgical marker, the primary defect shape was transferred to the donor site. The epidermal graft was then harvested.  The skin graft was then placed in the primary defect and oriented appropriately.
Dermal Autograft Text: The defect edges were debeveled with a #15 scalpel blade.  Given the location of the defect, shape of the defect and the proximity to free margins a dermal autograft was deemed most appropriate.  Using a sterile surgical marker, the primary defect shape was transferred to the donor site. The area thus outlined was incised deep to adipose tissue with a #15 scalpel blade.  The harvested graft was then trimmed of adipose and epidermal tissue until only dermis was left.  The skin graft was then placed in the primary defect and oriented appropriately.
Skin Substitute Text: The defect edges were debeveled with a #15 scalpel blade.  Given the location of the defect, shape of the defect and the proximity to free margins a skin substitute graft was deemed most appropriate.  The graft material was trimmed to fit the size of the defect. The graft was then placed in the primary defect and oriented appropriately.
Tissue Cultured Epidermal Autograft Text: The defect edges were debeveled with a #15 scalpel blade.  Given the location of the defect, shape of the defect and the proximity to free margins a tissue cultured epidermal autograft was deemed most appropriate.  The graft was then trimmed to fit the size of the defect.  The graft was then placed in the primary defect and oriented appropriately.
Xenograft Text: The defect edges were debeveled with a #15 scalpel blade.  Given the location of the defect, shape of the defect and the proximity to free margins a xenograft was deemed most appropriate.  The graft was then trimmed to fit the size of the defect.  The graft was then placed in the primary defect and oriented appropriately.
Purse String (Intermediate) Text: Given the location of the defect and the characteristics of the surrounding skin a purse string intermediate closure was deemed most appropriate.  Undermining was performed circumferentially around the surgical defect.  A purse string suture was then placed and tightened.
Purse String (Simple) Text: Given the location of the defect and the characteristics of the surrounding skin a purse string simple closure was deemed most appropriate.  Undermining was performed circumferentially around the surgical defect.  A purse string suture was then placed and tightened.
Complex Repair And Single Advancement Flap Text: The defect edges were debeveled with a #15 scalpel blade.  The primary defect was closed partially with a complex linear closure.  Given the location of the remaining defect, shape of the defect and the proximity to free margins a single advancement flap was deemed most appropriate for complete closure of the defect.  Using a sterile surgical marker, an appropriate advancement flap was drawn incorporating the defect and placing the expected incisions within the relaxed skin tension lines where possible.    The area thus outlined was incised deep to adipose tissue with a #15 scalpel blade.  The skin margins were undermined to an appropriate distance in all directions utilizing iris scissors.
Complex Repair And Double Advancement Flap Text: The defect edges were debeveled with a #15 scalpel blade.  The primary defect was closed partially with a complex linear closure.  Given the location of the remaining defect, shape of the defect and the proximity to free margins a double advancement flap was deemed most appropriate for complete closure of the defect.  Using a sterile surgical marker, an appropriate advancement flap was drawn incorporating the defect and placing the expected incisions within the relaxed skin tension lines where possible.    The area thus outlined was incised deep to adipose tissue with a #15 scalpel blade.  The skin margins were undermined to an appropriate distance in all directions utilizing iris scissors.
Complex Repair And Modified Advancement Flap Text: The defect edges were debeveled with a #15 scalpel blade.  The primary defect was closed partially with a complex linear closure.  Given the location of the remaining defect, shape of the defect and the proximity to free margins a modified advancement flap was deemed most appropriate for complete closure of the defect.  Using a sterile surgical marker, an appropriate advancement flap was drawn incorporating the defect and placing the expected incisions within the relaxed skin tension lines where possible.    The area thus outlined was incised deep to adipose tissue with a #15 scalpel blade.  The skin margins were undermined to an appropriate distance in all directions utilizing iris scissors.
Complex Repair And A-T Advancement Flap Text: The defect edges were debeveled with a #15 scalpel blade.  The primary defect was closed partially with a complex linear closure.  Given the location of the remaining defect, shape of the defect and the proximity to free margins an A-T advancement flap was deemed most appropriate for complete closure of the defect.  Using a sterile surgical marker, an appropriate advancement flap was drawn incorporating the defect and placing the expected incisions within the relaxed skin tension lines where possible.    The area thus outlined was incised deep to adipose tissue with a #15 scalpel blade.  The skin margins were undermined to an appropriate distance in all directions utilizing iris scissors.
Complex Repair And O-T Advancement Flap Text: The defect edges were debeveled with a #15 scalpel blade.  The primary defect was closed partially with a complex linear closure.  Given the location of the remaining defect, shape of the defect and the proximity to free margins an O-T advancement flap was deemed most appropriate for complete closure of the defect.  Using a sterile surgical marker, an appropriate advancement flap was drawn incorporating the defect and placing the expected incisions within the relaxed skin tension lines where possible.    The area thus outlined was incised deep to adipose tissue with a #15 scalpel blade.  The skin margins were undermined to an appropriate distance in all directions utilizing iris scissors.
Complex Repair And O-L Flap Text: The defect edges were debeveled with a #15 scalpel blade.  The primary defect was closed partially with a complex linear closure.  Given the location of the remaining defect, shape of the defect and the proximity to free margins an O-L flap was deemed most appropriate for complete closure of the defect.  Using a sterile surgical marker, an appropriate flap was drawn incorporating the defect and placing the expected incisions within the relaxed skin tension lines where possible.    The area thus outlined was incised deep to adipose tissue with a #15 scalpel blade.  The skin margins were undermined to an appropriate distance in all directions utilizing iris scissors.
Complex Repair And Bilobe Flap Text: The defect edges were debeveled with a #15 scalpel blade.  The primary defect was closed partially with a complex linear closure.  Given the location of the remaining defect, shape of the defect and the proximity to free margins a bilobe flap was deemed most appropriate for complete closure of the defect.  Using a sterile surgical marker, an appropriate advancement flap was drawn incorporating the defect and placing the expected incisions within the relaxed skin tension lines where possible.    The area thus outlined was incised deep to adipose tissue with a #15 scalpel blade.  The skin margins were undermined to an appropriate distance in all directions utilizing iris scissors.
Complex Repair And Melolabial Flap Text: The defect edges were debeveled with a #15 scalpel blade.  The primary defect was closed partially with a complex linear closure.  Given the location of the remaining defect, shape of the defect and the proximity to free margins a melolabial flap was deemed most appropriate for complete closure of the defect.  Using a sterile surgical marker, an appropriate advancement flap was drawn incorporating the defect and placing the expected incisions within the relaxed skin tension lines where possible.    The area thus outlined was incised deep to adipose tissue with a #15 scalpel blade.  The skin margins were undermined to an appropriate distance in all directions utilizing iris scissors.
Complex Repair And Rotation Flap Text: The defect edges were debeveled with a #15 scalpel blade.  The primary defect was closed partially with a complex linear closure.  Given the location of the remaining defect, shape of the defect and the proximity to free margins a rotation flap was deemed most appropriate for complete closure of the defect.  Using a sterile surgical marker, an appropriate advancement flap was drawn incorporating the defect and placing the expected incisions within the relaxed skin tension lines where possible.    The area thus outlined was incised deep to adipose tissue with a #15 scalpel blade.  The skin margins were undermined to an appropriate distance in all directions utilizing iris scissors.
Complex Repair And Rhombic Flap Text: The defect edges were debeveled with a #15 scalpel blade.  The primary defect was closed partially with a complex linear closure.  Given the location of the remaining defect, shape of the defect and the proximity to free margins a rhombic flap was deemed most appropriate for complete closure of the defect.  Using a sterile surgical marker, an appropriate advancement flap was drawn incorporating the defect and placing the expected incisions within the relaxed skin tension lines where possible.    The area thus outlined was incised deep to adipose tissue with a #15 scalpel blade.  The skin margins were undermined to an appropriate distance in all directions utilizing iris scissors.
Complex Repair And Transposition Flap Text: The defect edges were debeveled with a #15 scalpel blade.  The primary defect was closed partially with a complex linear closure.  Given the location of the remaining defect, shape of the defect and the proximity to free margins a transposition flap was deemed most appropriate for complete closure of the defect.  Using a sterile surgical marker, an appropriate advancement flap was drawn incorporating the defect and placing the expected incisions within the relaxed skin tension lines where possible.    The area thus outlined was incised deep to adipose tissue with a #15 scalpel blade.  The skin margins were undermined to an appropriate distance in all directions utilizing iris scissors.
Complex Repair And V-Y Plasty Text: The defect edges were debeveled with a #15 scalpel blade.  The primary defect was closed partially with a complex linear closure.  Given the location of the remaining defect, shape of the defect and the proximity to free margins a V-Y plasty was deemed most appropriate for complete closure of the defect.  Using a sterile surgical marker, an appropriate advancement flap was drawn incorporating the defect and placing the expected incisions within the relaxed skin tension lines where possible.    The area thus outlined was incised deep to adipose tissue with a #15 scalpel blade.  The skin margins were undermined to an appropriate distance in all directions utilizing iris scissors.
Complex Repair And M Plasty Text: The defect edges were debeveled with a #15 scalpel blade.  The primary defect was closed partially with a complex linear closure.  Given the location of the remaining defect, shape of the defect and the proximity to free margins an M plasty was deemed most appropriate for complete closure of the defect.  Using a sterile surgical marker, an appropriate advancement flap was drawn incorporating the defect and placing the expected incisions within the relaxed skin tension lines where possible.    The area thus outlined was incised deep to adipose tissue with a #15 scalpel blade.  The skin margins were undermined to an appropriate distance in all directions utilizing iris scissors.
Complex Repair And Double M Plasty Text: The defect edges were debeveled with a #15 scalpel blade.  The primary defect was closed partially with a complex linear closure.  Given the location of the remaining defect, shape of the defect and the proximity to free margins a double M plasty was deemed most appropriate for complete closure of the defect.  Using a sterile surgical marker, an appropriate advancement flap was drawn incorporating the defect and placing the expected incisions within the relaxed skin tension lines where possible.    The area thus outlined was incised deep to adipose tissue with a #15 scalpel blade.  The skin margins were undermined to an appropriate distance in all directions utilizing iris scissors.
Complex Repair And W Plasty Text: The defect edges were debeveled with a #15 scalpel blade.  The primary defect was closed partially with a complex linear closure.  Given the location of the remaining defect, shape of the defect and the proximity to free margins a W plasty was deemed most appropriate for complete closure of the defect.  Using a sterile surgical marker, an appropriate advancement flap was drawn incorporating the defect and placing the expected incisions within the relaxed skin tension lines where possible.    The area thus outlined was incised deep to adipose tissue with a #15 scalpel blade.  The skin margins were undermined to an appropriate distance in all directions utilizing iris scissors.
Complex Repair And Z Plasty Text: The defect edges were debeveled with a #15 scalpel blade.  The primary defect was closed partially with a complex linear closure.  Given the location of the remaining defect, shape of the defect and the proximity to free margins a Z plasty was deemed most appropriate for complete closure of the defect.  Using a sterile surgical marker, an appropriate advancement flap was drawn incorporating the defect and placing the expected incisions within the relaxed skin tension lines where possible.    The area thus outlined was incised deep to adipose tissue with a #15 scalpel blade.  The skin margins were undermined to an appropriate distance in all directions utilizing iris scissors.
Complex Repair And Dorsal Nasal Flap Text: The defect edges were debeveled with a #15 scalpel blade.  The primary defect was closed partially with a complex linear closure.  Given the location of the remaining defect, shape of the defect and the proximity to free margins a dorsal nasal flap was deemed most appropriate for complete closure of the defect.  Using a sterile surgical marker, an appropriate flap was drawn incorporating the defect and placing the expected incisions within the relaxed skin tension lines where possible.    The area thus outlined was incised deep to adipose tissue with a #15 scalpel blade.  The skin margins were undermined to an appropriate distance in all directions utilizing iris scissors.
Complex Repair And Ftsg Text: The defect edges were debeveled with a #15 scalpel blade.  The primary defect was closed partially with a complex linear closure.  Given the location of the defect, shape of the defect and the proximity to free margins a full thickness skin graft was deemed most appropriate to repair the remaining defect.  The graft was trimmed to fit the size of the remaining defect.  The graft was then placed in the primary defect, oriented appropriately, and sutured into place.
Complex Repair And Burow's Graft Text: The defect edges were debeveled with a #15 scalpel blade.  The primary defect was closed partially with a complex linear closure.  Given the location of the defect, shape of the defect, the proximity to free margins and the presence of a standing cone deformity a Burow's graft was deemed most appropriate to repair the remaining defect.  The graft was trimmed to fit the size of the remaining defect.  The graft was then placed in the primary defect, oriented appropriately, and sutured into place.
Complex Repair And Split-Thickness Skin Graft Text: The defect edges were debeveled with a #15 scalpel blade.  The primary defect was closed partially with a complex linear closure.  Given the location of the defect, shape of the defect and the proximity to free margins a split thickness skin graft was deemed most appropriate to repair the remaining defect.  The graft was trimmed to fit the size of the remaining defect.  The graft was then placed in the primary defect, oriented appropriately, and sutured into place.
Complex Repair And Epidermal Autograft Text: The defect edges were debeveled with a #15 scalpel blade.  The primary defect was closed partially with a complex linear closure.  Given the location of the defect, shape of the defect and the proximity to free margins an epidermal autograft was deemed most appropriate to repair the remaining defect.  The graft was trimmed to fit the size of the remaining defect.  The graft was then placed in the primary defect, oriented appropriately, and sutured into place.
Complex Repair And Dermal Autograft Text: The defect edges were debeveled with a #15 scalpel blade.  The primary defect was closed partially with a complex linear closure.  Given the location of the defect, shape of the defect and the proximity to free margins an dermal autograft was deemed most appropriate to repair the remaining defect.  The graft was trimmed to fit the size of the remaining defect.  The graft was then placed in the primary defect, oriented appropriately, and sutured into place.
Complex Repair And Tissue Cultured Epidermal Autograft Text: The defect edges were debeveled with a #15 scalpel blade.  The primary defect was closed partially with a complex linear closure.  Given the location of the defect, shape of the defect and the proximity to free margins an tissue cultured epidermal autograft was deemed most appropriate to repair the remaining defect.  The graft was trimmed to fit the size of the remaining defect.  The graft was then placed in the primary defect, oriented appropriately, and sutured into place.
Complex Repair And Xenograft Text: The defect edges were debeveled with a #15 scalpel blade.  The primary defect was closed partially with a complex linear closure.  Given the location of the defect, shape of the defect and the proximity to free margins a xenograft was deemed most appropriate to repair the remaining defect.  The graft was trimmed to fit the size of the remaining defect.  The graft was then placed in the primary defect, oriented appropriately, and sutured into place.
Complex Repair And Skin Substitute Graft Text: The defect edges were debeveled with a #15 scalpel blade.  The primary defect was closed partially with a complex linear closure.  Given the location of the remaining defect, shape of the defect and the proximity to free margins a skin substitute graft was deemed most appropriate to repair the remaining defect.  The graft was trimmed to fit the size of the remaining defect.  The graft was then placed in the primary defect, oriented appropriately, and sutured into place.
Path Notes (To The Dermatopathologist): Please check margins.
Consent was obtained from the patient. The risks and benefits to therapy were discussed in detail. Specifically, the risks of infection, scarring, bleeding, prolonged wound healing, incomplete removal, allergy to anesthesia, nerve injury and recurrence were addressed. Prior to the procedure, the treatment site was clearly identified and confirmed by the patient. All components of Universal Protocol/PAUSE Rule completed.
Render Post-Care Instructions In Note?: yes
Post-Care Instructions: I reviewed with the patient in detail post-care instructions. Patient is not to engage in any heavy lifting, exercise, or swimming for the next 14 days. Should the patient develop any fevers, chills, bleeding, severe pain patient will contact the office immediately.
Home Suture Removal Text: Patient was provided a home suture removal kit and will remove their sutures at home.  If they have any questions or difficulties they will call the office.
Where Do You Want The Question To Include Opioid Counseling Located?: Case Summary Tab
Billing Type: Third-Party Bill
Information: Selecting Yes will display possible errors in your note based on the variables you have selected. This validation is only offered as a suggestion for you. PLEASE NOTE THAT THE VALIDATION TEXT WILL BE REMOVED WHEN YOU FINALIZE YOUR NOTE. IF YOU WANT TO FAX A PRELIMINARY NOTE YOU WILL NEED TO TOGGLE THIS TO 'NO' IF YOU DO NOT WANT IT IN YOUR FAXED NOTE.

## 2023-10-10 ENCOUNTER — APPOINTMENT (RX ONLY)
Dept: URBAN - METROPOLITAN AREA CLINIC 24 | Facility: CLINIC | Age: 33
Setting detail: DERMATOLOGY
End: 2023-10-10

## 2023-10-10 DIAGNOSIS — Z48.01 ENCOUNTER FOR CHANGE OR REMOVAL OF SURGICAL WOUND DRESSING: ICD-10-CM

## 2023-10-10 PROCEDURE — 99024 POSTOP FOLLOW-UP VISIT: CPT

## 2023-10-10 PROCEDURE — ? SUTURE REMOVAL (GLOBAL PERIOD)

## 2023-10-10 ASSESSMENT — LOCATION DETAILED DESCRIPTION DERM: LOCATION DETAILED: PERIUMBILICAL SKIN

## 2023-10-10 ASSESSMENT — LOCATION SIMPLE DESCRIPTION DERM: LOCATION SIMPLE: ABDOMEN

## 2023-10-10 ASSESSMENT — LOCATION ZONE DERM: LOCATION ZONE: TRUNK

## 2023-10-10 NOTE — PROCEDURE: SUTURE REMOVAL (GLOBAL PERIOD)
Detail Level: Detailed
Add 87699 Cpt? (Important Note: In 2017 The Use Of 22145 Is Being Tracked By Cms To Determine Future Global Period Reimbursement For Global Periods): yes

## 2024-04-22 ENCOUNTER — APPOINTMENT (RX ONLY)
Dept: URBAN - METROPOLITAN AREA CLINIC 23 | Facility: CLINIC | Age: 34
Setting detail: DERMATOLOGY
End: 2024-04-22

## 2024-04-22 DIAGNOSIS — Z87.2 PERSONAL HISTORY OF DISEASES OF THE SKIN AND SUBCUTANEOUS TISSUE: ICD-10-CM

## 2024-04-22 DIAGNOSIS — L81.4 OTHER MELANIN HYPERPIGMENTATION: ICD-10-CM

## 2024-04-22 PROCEDURE — ? OBSERVATION

## 2024-04-22 PROCEDURE — ? COUNSELING

## 2024-04-22 PROCEDURE — ? ADDITIONAL NOTES

## 2024-04-22 PROCEDURE — 99212 OFFICE O/P EST SF 10 MIN: CPT

## 2024-04-22 ASSESSMENT — LOCATION DETAILED DESCRIPTION DERM: LOCATION DETAILED: PERIUMBILICAL SKIN

## 2024-04-22 ASSESSMENT — LOCATION ZONE DERM: LOCATION ZONE: TRUNK

## 2024-04-22 ASSESSMENT — LOCATION SIMPLE DESCRIPTION DERM: LOCATION SIMPLE: ABDOMEN

## 2024-04-22 NOTE — PROCEDURE: ADDITIONAL NOTES
Render Risk Assessment In Note?: no
Additional Notes: Excisional pathology reviewed. No residual lesion was appreciated. I suspect this is a lentigo over scar. Reasonable to monitor for changes, but should be stable in appearance from this point forward.
Detail Level: Simple

## 2024-08-29 ENCOUNTER — APPOINTMENT (RX ONLY)
Dept: URBAN - METROPOLITAN AREA CLINIC 24 | Facility: CLINIC | Age: 34
Setting detail: DERMATOLOGY
End: 2024-08-29

## 2024-08-29 DIAGNOSIS — L57.8 OTHER SKIN CHANGES DUE TO CHRONIC EXPOSURE TO NONIONIZING RADIATION: ICD-10-CM

## 2024-08-29 DIAGNOSIS — Z71.89 OTHER SPECIFIED COUNSELING: ICD-10-CM

## 2024-08-29 DIAGNOSIS — D22 MELANOCYTIC NEVI: ICD-10-CM

## 2024-08-29 DIAGNOSIS — Z87.2 PERSONAL HISTORY OF DISEASES OF THE SKIN AND SUBCUTANEOUS TISSUE: ICD-10-CM

## 2024-08-29 DIAGNOSIS — Z80.8 FAMILY HISTORY OF MALIGNANT NEOPLASM OF OTHER ORGANS OR SYSTEMS: ICD-10-CM

## 2024-08-29 PROBLEM — D22.72 MELANOCYTIC NEVI OF LEFT LOWER LIMB, INCLUDING HIP: Status: ACTIVE | Noted: 2024-08-29

## 2024-08-29 PROBLEM — D22.9 MELANOCYTIC NEVI, UNSPECIFIED: Status: ACTIVE | Noted: 2024-08-29

## 2024-08-29 PROCEDURE — ? SHAVE REMOVAL

## 2024-08-29 PROCEDURE — 99213 OFFICE O/P EST LOW 20 MIN: CPT | Mod: 25

## 2024-08-29 PROCEDURE — ? SUNSCREEN RECOMMENDATIONS

## 2024-08-29 PROCEDURE — ? COUNSELING

## 2024-08-29 PROCEDURE — 11300 SHAVE SKIN LESION 0.5 CM/<: CPT

## 2024-08-29 ASSESSMENT — LOCATION SIMPLE DESCRIPTION DERM
LOCATION SIMPLE: LEFT UPPER BACK
LOCATION SIMPLE: RIGHT SHOULDER
LOCATION SIMPLE: LEFT UPPER ARM
LOCATION SIMPLE: LEFT THIGH
LOCATION SIMPLE: ABDOMEN
LOCATION SIMPLE: CHEST
LOCATION SIMPLE: LEFT SHOULDER

## 2024-08-29 ASSESSMENT — LOCATION DETAILED DESCRIPTION DERM
LOCATION DETAILED: LEFT POSTERIOR SHOULDER
LOCATION DETAILED: STERNAL NOTCH
LOCATION DETAILED: PERIUMBILICAL SKIN
LOCATION DETAILED: LEFT POSTERIOR LATERAL DISTAL THIGH
LOCATION DETAILED: LEFT ANTERIOR PROXIMAL UPPER ARM
LOCATION DETAILED: LEFT MEDIAL UPPER BACK
LOCATION DETAILED: LEFT ANTERIOR DISTAL THIGH
LOCATION DETAILED: RIGHT POSTERIOR SHOULDER

## 2024-08-29 ASSESSMENT — LOCATION ZONE DERM
LOCATION ZONE: ARM
LOCATION ZONE: LEG
LOCATION ZONE: TRUNK

## 2024-08-29 NOTE — PROCEDURE: SHAVE REMOVAL
Medical Necessity Information: It is in your best interest to select a reason for this procedure from the list below. All of these items fulfill various CMS LCD requirements except the new and changing color options.
Medical Necessity Clause: This procedure was medically necessary because the lesion that was treated was:hit with grooming
Lab: [id number]
Lab Facility: [id number]
Detail Level: Detailed
Was A Bandage Applied: Yes
Size Of Lesion In Cm (Required): 0.3
X Size Of Lesion In Cm (Optional): 0
Depth Of Shave: dermis
Biopsy Method: double edge Personna blade
Anesthesia Type: 1% lidocaine without epinephrine and a 1:12 solution of 8.4% sodium bicarbonate
Anesthesia Volume In Cc: 0.4
Hemostasis: Aluminum Chloride
Wound Care: Petrolatum
Render Path Notes In Note?: No
Consent was obtained from the patient. The risks and benefits to therapy were discussed in detail. Specifically, the risks of infection, scarring, bleeding, prolonged wound healing, incomplete removal, allergy to anesthesia, nerve injury and recurrence were addressed. Prior to the procedure, the treatment site was clearly identified and confirmed by the patient. All components of Universal Protocol/PAUSE Rule completed.
Post-Care Instructions: I reviewed with the patient in detail post-care instructions. Patient is to keep the biopsy site dry overnight, and then apply bacitracin twice daily until healed. Patient may apply hydrogen peroxide soaks to remove any crusting.
Notification Instructions: Patient will be notified of pathology results. However, patient instructed to call the office if not contacted within 2 weeks.
Billing Type: Third-Party Bill

## 2024-08-29 NOTE — HPI: EVALUATION OF SKIN LESION(S)
REASON FOR FOLLOW UP:     1. Rectal cancer, rectal ultrasound examination in July 2019 reported T3N0 disease without lymphadenopathy. She does have small pulmonary nodule 6-7 mm and 2 mm with indeterminate feature. There is no solid evidence of metastatic disease otherwise. Patient has stage IIA (T3N0M0) disease.  2. The patient is heterozygous factor V Leiden, was on prophylactic anticoagulation with Lovenox 40 mg daily given her increased risk of thrombosis with MediPort and GI malignancy.   3. PET scan on 8/8/2019 reported an 8 mm hypermetabolic right upper lobe pulmonary nodule, which is suspicious for metastatic as well.    4. Patient had a PowerPort placement on the left upper chest by Dr. Joseph on 8/9/2019.  5. Patient was started on concurrent infusional 5-FU chemoradiation therapy on 8/12/2019, with planned complete surgical resection and further adjuvant chemotherapy with intention to cure the disease.   6. Patient underwent surgical resection of the primary rectal cancer by Dr. Ye on 12/2/2019.  Pathology evaluation reported residual T3N1 disease stage IIIb.  7. Diarrhea related to therapy and radiation.   8. Patient was started cycle 1 day 1 of adjuvant FOLFOX 6 on 1/23/2020.  9. On 2/5/2020 FOLFOX 6 cycle 2 delayed secondary to neutropenia.  Patient was given 3 days of Granix injection.  After cycle #2 we planned 3 days of Granix with each cycle.   10. Patient also intolerant of oral iron.  Patient received 2 doses of IV injectafer on 02/05/2020 and 02/12/2020.   11. 02/12/2020 Proceed with cycle #2 of FOLFOX 6 with 3 days of Granix.  12. On 3/11/2020 cycle 4 postponed secondary to abdominal pain and occasional low-grade fevers.  CT scans ordered.  13. Cycle #4 resumed after CT scan revealed no evidence of disease.  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 Ye. patient has no fever.  Continue to observe.   14. Grade 3 
What Type Of Note Output Would You Prefer (Optional)?: Standard Output
Hpi Title: Evaluation of Skin Lesions
hand-foot syndrome secondary to 5-FU after cycle #7 FOLFOX 6 chemotherapy, prompting ER visit.  Also has worsening peripheral neuropathy.   15. Cycle #8 FOLFOX 6 was given on 5/13/2020, with 50% dose reduction for both 5-FU and oxaliplatin, and also examination of bolus 5-FU.   16. PET scan examination on 5/21/2020 reported no evidence of metastatic disease in the chest abdomen pelvis.  Stable 6 mm RUL pulmonary nodule.  17. On 5/27/2020, I discussed with the patient that we can discontinue chemotherapy at this time.   18. Patient had a surgical procedure for low anterior colon resection, coloanal anastomosis on 8/3/2020.  19. CT scan for chest abdomen pelvis on 9/9/2020 reported a new 8 mm noncalcified pulmonary nodule in the left lower lobe of the lung.  Otherwise stable right upper lobe 6 mm pulmonary nodule, and no evidence of disease recurrence in the abdomen or pelvis.  20. PET scan examination on 9/18/2020 reported multiple hypermetabolic small pulmonary nodules/ new pulmonary nodules.   21. Patient was started on pelvic chemotherapy with FOLFIRI regimen on 10/13/2020.   22. Further genetic study [facility] reported positive for [place] mutation.  But wild-type NRAS. 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   23. Cycle #5 was without irinotecan, due to peripheral neuropathy.  24. Hospitalization with new SVC and left brachiocephalic thrombus which developed while on anticoagulation with Xarelto.  Patient was switched to weight-based Lovenox injection.  25. Cycle #6 5-FU and irinotecan was delayed by 2 weeks because of the above incident.    HISTORY OF PRESENT ILLNESS:  The patient is a 39 y.o. female with the above-mentioned history is here today for reevaluation, prior to resume chemotherapy cycle #6.      She recently had a hospitalization from 12/21/2020 through 12/24/2020 with a new thrombus of the superior vena cava which developed after a 
How Severe Are Your Spot(S)?: moderate
new PowerPort catheter placement while the patient was on Xarelto.  Patient had a new port placed 12/18/2020, and had held her Xarelto for 2 days prior to surgery.  She presented to the ER on 12/21/20 with complaints of facial and arm swelling for 3 days.  She also noted increased shortness of breath.  She was found to have a thrombus of the SVC and left brachiocephalic vein.  Thrombus within the right internal jugular vein cannot be excluded.  Patient was started on IV heparin, and eventually transitioned to weight-based Lovenox, which she now continues.    Patient reports her facial swelling and swelling on her right arm is being better.  Her breathing also has improved.  She continues on Lovenox, with no bleeding or bruising.  She is more energetic, performance status ECOG 2.    Patient reports no fever sweating chills.  Appetite is reasonable.     Patient reports neuropathy has improved, she only has very mild numbness tingling involving the tip of fingers and also the toes.    Patient reports she was just seen by her surgeon Dr. Esparza yesterday, her abdomen wound has completely healed.  There is planning for possible reversal of ileostomy in about 3 months.        Past Medical History:   Diagnosis Date   • Abdominopelvic abscess (CMS/HCC) 08/12/2020    ADMITTED TO [facility]   • Abnormal Pap smear of cervix 02/02/1998    JULIUS I   • Anemia in pregnancy    • Anxiety    • Bilateral epiphora 04/2020   • Bilateral hand swelling 05/02/2020    SEEN AT [facility] ER   • Cervical lymphadenitis 06/06/2012    SEEN AT [facility] ER   • Chemotherapy induced neutropenia (CMS/HCC) 04/2020   • Chemotherapy-induced nausea 02/2020   • Chemotherapy-induced thrombocytopenia 05/2020   • Chronic diarrhea    • Colon polyps     FOLLOWED BY DR. GERONIMO ESPARZA   • Cystitis 04/24/2020    WITH DEHYDRATION, ADMITTED TO [facility]   • Cystitis 10/27/2020   • Drug-induced peripheral neuropathy (CMS/HCC) 05/2020   • Fistula of intestine    • GERD (gastroesophageal reflux 
Have Your Spot(S) Been Treated In The Past?: has not been treated
disease)    • Hand foot syndrome 2020   • Heart murmur     IN CHILDHOOD   • Hematochezia    • Hemorrhoids    • Heterozygous factor V Leiden mutation (CMS/HCC)     DX 2019   • History of anemia    • History of chemotherapy     FOLLOWED BY DR. ALEXANDRU HUNT   • History of gestational diabetes    • History of pre-eclampsia    • History of radiation therapy     FOLLOWED BY DR. JAVON LEWIS   • History of TB skin testing 2009    TB Skin Test   • HPV in female    • Hypokalemia 2019   • Hypomagnesemia 2019   • IBS (irritable bowel syndrome)    • Ileostomy in place (CMS/HCC)     FOLLOWED BY DR. GERONIMO YE   • Infected insect bite of neck 2016    [facility]   • Kidney stones 2007    SEEN AT [facility] ER   • Lump of right breast     SWOLLEN LYMPH NODE   • Lung cancer (CMS/HCC) 2020    METASTATIC LUNG CANCER   • Monopolar depression (CMS/HCC)    • On anticoagulant therapy    • Perirectal abscess 2020   • PIH (pregnancy induced hypertension)    • Pulmonary embolism without acute cor pulmonale (CMS/HCC) 09/20/2019    X 3; ADMITTED TO [facility]   • Pulmonary nodule, right 2020   • Rectal cancer (CMS/HCC) 2019    STAGE IIA, INVASIVE MODERATELY DIFFERENTIATED ADENOCARCINOMA, CHEMO AND XRT FINISHED 2019   • Right shoulder pain 2020    SEEN AT [facility] ER   • Right ureteral stone 10/01/2019    SEEN AT [facility] ER   • SAB (spontaneous ) 1996     A2-1 INDUCED   • Sciatica    • Sepsis due to cellulitis (CMS/HCC) 2002    LEFT GREAT TOE, ADMITTED TO [facility]   • Tachycardia 2020   • Urinary urgency 2020     Past Surgical History:   Procedure Laterality Date   • CHOLECYSTECTOMY N/A 10/10/2003    LAPAROSCOPIC WITH CHOLANGIOGRAM, DR. JAMEY TALAVERA AT [facility]   • COLON RESECTION N/A 2019    Procedure: laparoscopic low anterior colon resection with mobilization of splenic flexure and diverting loop ileostomy: ERAS;  Surgeon: Geronimo eY MD;  
Family Member: Mother
Location: [facility] MAIN OR;  Service: General   • COLON RESECTION N/A 8/3/2020    Procedure: LOW ANTERIOR COLON RESECTION, COLOANAL ANASTOMOSIS, MOBILIZATION SPLENIC FLEXURE;  Surgeon: Geronimo Ye MD;  Location: [facility] MAIN OR;  Service: General;  Laterality: N/A;   • COLONOSCOPY N/A 7/15/2020    PATENT ANASTAMOSIS IN MID RECTUM, RESCOPE IN 1 YR, DR. GERONIMO YE AT [facility]   • COLONOSCOPY W/ POLYPECTOMY N/A 07/08/2019    15 MM TUBULOVILLOUS ADENOMA POLYP IN HEPATIC FLEXURE, 20 MMTUBULOVILLOUS ADENOMA WITH HIGH GRADE DYSPLASIA IN RECTOSIGMOID, 4 CM MASS IN MID RECTUM, PATH: INVASIVE MODERATELY DIFFERENTIATED ADENOCARCINOMA, DR. JENNIFER LI AT [facility]   • DILATATION AND EVACUATION N/A 2009   • ENDOSCOPY N/A 07/08/2019    LA GRADE A ESOPHAGITIS, GASTRITIS, ALL BIOPSIES BENIGN, DR. JENNIFER LI AT [facility]   • INCISION AND DRAINAGE PERIRECTAL ABSCESS N/A 8/14/2020    Procedure: INCISION AND DRAINAGE OF retrorectal dehiscence abcess with drain placement and irrigation;  Surgeon: Geronimo Ye MD;  Location: [facility] OR;  Service: General;  Laterality: N/A;   • PAP SMEAR N/A 01/24/2014   • SIGMOIDOSCOPY N/A 7/24/2019    INFILTRATIVE PARTIALLY OBSTRUCTING LARGE RECTAL CANCER, AREA TATOOED, DR. GERONIMO YE AT [facility]   • SIGMOIDOSCOPY N/A 11/23/2019    AN ULCERATED PARTIALLY OBSTRUCTING MASS IN MID RECTUM, AREA TATTOOED, DR. GERONIMO YE AT [facility]   • TRANSRECTAL ULTRASOUND N/A 7/24/2019    Procedure: ULTRASOUND TRANSRECTAL;  Surgeon: Geronimo Ye MD;  Location: [facility] ENDOSCOPY;  Service: General   • URETEROSCOPY LASER LITHOTRIPSY WITH STENT INSERTION Right 10/30/2019    DR. ESTUARDO BEASLEY AT [place]   • VAGINAL DELIVERY N/A 09/18/1998   • VENOUS ACCESS DEVICE (PORT) INSERTION Left 8/9/2019    Procedure: INSERTION VENOUS ACCESS DEVICE;  Surgeon: Sj Joseph MD;  Location: [facility] OR [facility];  Service: General   • VENOUS ACCESS DEVICE (PORT) INSERTION N/A 12/18/2020    Procedure: INSERTION 
VENOUS ACCESS DEVICE right side, removal venous access device left side;  Surgeon: Sj Joseph MD;  Location: [facility];  Service: General;  Laterality: N/A;   • WISDOM TOOTH EXTRACTION Bilateral 1993       HEMATOLOGIC/ONCOLOGIC HISTORY:      The patient reports she has intermittent rectal bleeding and urgency, mucous with her stool, starting sometime in 2016. At that time she was referred to GI service, and was diagnosed of irritable bowel syndrome. Nevertheless she had worsening urgency for bowel movement, and had a couple of incidents losing control of stool. She was recently seen by Roland Thorpe MD again and had colonoscopy and EGD exam on 07/08/2019. She was found having a circumferential rectal mass. According to the procedure note, the patient had a fungating circumferential bleeding 4 cm mass in the middle rectum, at distance between 13 cm and 17 cm from the anus. Mass was causing partial obstruction. There were also colon polyps found at the hepatic flexure and also at the rectosigmoid junction 23 cm from the anus. Both were resected and retrieved. EGD examination reported grade A esophagitis at the GE junction and patchy discontinuous erythema and aggravation of the mucosa without bleeding in the stomach body. There is normal mucosa of the duodenum. Pathology evaluation from this procedure reported moderately differentiated adenocarcinoma involving the rectal mass. The rectal sigmoid junction polyp was tubular/tubulovillous adenoma with high grade dysplasia negative for invasive malignancy. The hepatic flexure polyp was also tubular/tubulovillous adenoma negative for high grade dysplasia or malignancy. The biopsy from the esophagus reports squamocolumnar mucosa with inflammatory changes suggestive of mild reflux esophagitis, negative for interstitial metaplasia. Gastric biopsy was benign and duodenal biopsy was also benign. There is no mention of H-pylori.     Patient was subsequently referred to 
colorectal surgeon Padmaja Ye MD for further evaluation. The patient had CT scan examination for chest, abdomen and pelvis requested by Dr. Ye and were done on 07/13/2019. The study reported no evidence of lymphadenopathy in the abdomen and pelvis. There was wall thickening of the rectosigmoid junction. Normal spleen, pancreas, adrenal glands and kidneys. There was fatty liver infiltration but no focal lymphatic lesions. There was a small 6-7 mm noncalcified nodule in the right upper lobe and a tiny 3 mm subpleural nodule in the right middle lobe. No mediastinal or hilar lymphadenopathy.     Dr. Ye performed staging rectal ultrasound examination. This study reported tumor penetrating through the muscularis propria as T3 disease; there were no lymph nodes identified.    She had a hospitalization in late September 2019 because of newly discovered pulmonary emboli.  She was on prophylactic Lovenox prior to the incident of PE.  Now she is on full dose Xarelto anticoagulation.  Patient reports no further chest pain dyspnea.  She denies bleeding or bruising.  During her hospitalization, she was seen by Dr. Ye, who plans to have surgery 8 to 12 weeks after finishing radiation therapy.  She finished her radiation on 9/19/2019.     I noticed patient also presented to the emergency room on 10/1/2019 complaining of right flank area pain.  I reviewed the images studies and indeed she has a very small 1 to 2 mm obstructing kidney stone in the UV junction.  Patient is still symptomatic with some pains and dysuria.  She denies fever sweating or chills.    Laboratory study on 10/7/2019 reported normal CBC including hemoglobin 13.1, platelets 301,000, WBC 6170 and ANC 4900 lymphocytes 590 monocytes 480.      The nurse reported malfunction of port-a-catheter on 10/7/2019.  Port study on 10/8/2019 showed fibrin sheath around the distal aspect of the Mediport catheter in the SVC. This does not appear to hinder injection, 
but does prevent aspiration at this time.    Patient underwent surgical resection of the colon on 12/2/2019 with Dr. Ye.  Pathology evaluation reported residual T3 disease, also 1 out of 14 lymph nodes positive for malignancy.  So this patient in either had at least stage IIIb disease (T3 N1 M0?).      Adjuvant chemotherapy FOLFOX 6 will be started on 1/22/2020.  Laboratory study reported iron saturation 10%, free iron 31 TIBC 319 and ferritin 168.  Her hemoglobin was 11.8, WBC 5600, and platelets 347,000.    Patient was here on 02/12/2020 for cycle 2 of her FOLFOX.  This is delayed x1 week secondary to neutropenia.  The patient ultimately received 3 days worth of Neupogen with recovery of her blood counts.  Of note, the patient struggled with significant nausea without any episodes of vomiting following cycle #1 of chemotherapy resulting in significant weight loss.  She is up 12 pounds over the last week as her appetite has normalized.  We will add Emend to her care plan.    She is having several loose stools per her colostomy and has been hesitant to take Imodium due to prior history of constipation.  I encouraged her to try this with a maximum of 8 tablets/day.  She denies any infectious symptoms including fevers or chills.  No excess bleeding or bruising noted.  She had the expected cold sensitivity related to the Oxaliplatin for about 3 days following treatment.    Labs from 02/12/2020 demonstrated total white blood cell count of 5.14, ANC of 3.06, hemoglobin of 11.2, platelets of 211,000.  She was found to be iron deficient last week and is intolerant to oral iron secondary to GI distress.  For this reason, she initiated IV iron therapy with Injectafer last week.  She had received her second dose 02/12/2020    Patient has normalized hemoglobin 12.2 on 2/26/2020.    She reported on 5/5/2020 she had a recent visit to the emergency department for what was suspected to be an allergic reaction.  She called our 
on-call service on Saturday with reports of hand and face swelling.  She was instructed to proceed to the emergency department at which time she was assessed and prescribed a Medrol Dosepak.  She had just completed her 5-FU and was unhooked on Friday, 5/3/2020.  Her symptoms have improved.  She does report persistent hyperpigmentation and mild swelling of the palms of the hands but this is much improved.  It was her right hand specifically that was swollen.  Her facial swelling has resolved.  She continues on Cefdinir nd since has 1 day remaining, she was prescribed cefdinir for a UTI requiring hospitalization at the end of April.  Reports no new symptoms.  Her labs are stable.  She is scheduled for treatment again.    Patient states at this time she is not tolerating her chemotherapy well.     Patient seen in the emergency department on 5/2/2020 for what was suspected to be an allergic reaction.  She called our on-call service on Saturday explaining that she was experiencing hand and face swelling.  She was instructed to proceed to the emergency department at which time she was assessed and prescribed a Medrol Dosepak.  She had just completed her 5-FU and was unhooked on Friday, 5/1/2020.      She reports since her ED visit on 5/2/2020 her symptoms have not improved. Her hands and feet were swollen upon presenting to the ED and she could barely make a fist. She states that she feels so swollen she is not able to stand it. She states that her skin on the hands and feet are peeling extensively as well, besides changing color to more dark.     She also reports significant fatigue after her first week of the 5-FU treatment but she expected this side effect. She also notices significant increase in her neuropathy to the point that she is not able to even walk around in her home without her house slippers due to irritation from her rugs. She denies and associated nausea or vomiting at this time. She also has episodes of 
epistaxis every day after the chemotherapy cycle #7.  She does report working full-time during the week of chemotherapy.    Laboratory studies, 5/13/2020, show moderate thrombocytopenia platelets 123,000, low normal WBC 4140 including ANC 2720 and normal hemoglobin 13.4.  Because significant hand-foot syndrome, will decrease both 5-FU and oxaliplatin by 50%, and eliminate bolus dose of 5-FU.    On 5/21/2020 patient had a PET scan performed which showed no convincing evidence of residual disease in abdomen or pelvis, no metastatic disease within the chest or neck.     Cycle #8 FOLFOX 6 was given on 5/13/2020, with 50% dose reduction for both 5-FU and oxaliplatin, and also examination of bolus 5-FU.  She states on 5/27/2020 that with the recent reduction of the chemotherapy she feels significantly better. She has more energy and is able to do her daily routine.      PET scan examination on 5/21/2020 reported no evidence of metastatic disease in chest abdomen pelvis.  There was a stable 6 mm right upper lobe pulmonary nodule.    Laboratory studies on 5/27/2020 showed mild leukocytopenia WBC 3720 but a normal ANC 2250 and lymphocytes 630.  Normal platelets 163,000 and hemoglobin 12.6.  Chemistry lab reported normal renal function, liver function panel and a electrolytes, elevated glucose 164.    Laboratory studies 6/24/2020, showed normal hemoglobin 13.4 but macrocytosis .9.  Normal platelets 210,000 and WBC 5870.  She had normal CMP.     Patient last time was here in late June 2020.  Since that time she had surgical procedure for low anterior colon resection, coloanal anastomosis on 8/3/2020.  She later developed a perirectal abscess, required surgical drainage on 8/14/2020.  She was discharged on 8/27/2020.    Patient reports to me she still has lower abdominal wall vacuum suction in place.  She denies fever sweating chills.  Performance status is ECOG 1.  She continues to walk with part-time in office, and 
part-time at home.  She does have visiting nurse come to the home for wound care.    CT scan for chest abdomen pelvis on 9/9/2020 reported a new 8 mm noncalcified pulmonary nodule in the left lower lobe.  Otherwise stable right upper lobe 6 mm pulmonary nodule, and no evidence of disease recurrence in the abdomen or pelvis.     Laboratory study on 9/16/2020 reported elevated AST 55, ALT 95, alk phosphatase 143 but normal total bilirubin 0.3.  Chemistry lab otherwise unremarkable.  She has completed normal CBC.      Because of the abnormal CT scan examination for chest abdomen pelvis on 9/9/2020 reported a new 8 mm noncalcified pulmonary nodule in the left lower lobe, we obtained a PET scan examination for further evaluation, which was done on 9/18/2020.  This study reported several pulmonary nodules, some of them are hypermetabolic, newly developed compared to previous PET scan in May 2020.  Certainly does highly suspicious for metastatic disease.  There are also hypermetabolic activity in the abdomen and pelvic area which is hard to differentiate from surgical scar versus metastatic malignancy.    Laboratory study on 10/13/2020 reported normal CBC with Hb 13.9, platelets 302,000 and WBC 5520 including ANC 3830.  Chemistry lab reported normalized AST 20, alk phosphatase 116 improved ALT 35, and maintains normal bilirubin, with normal electrolytes and liver function panel.     Patient was started on first cycle of palliative chemotherapy FOLFIRI on 10/13/2020.    MEDICATIONS    Current Outpatient Medications:   •  clonazePAM (KlonoPIN) 1 MG tablet, Take 1 tablet by mouth 3 (Three) Times a Day As Needed for Anxiety or Seizures., Disp: 90 tablet, Rfl: 0  •  Diclofenac Sodium (VOLTAREN) 1 % gel gel, Apply 4 g topically to the appropriate area as directed 3 (Three) Times a Day., Disp: 150 g, Rfl: 3  •  dicyclomine (BENTYL) 20 MG tablet, TAKE 1 TABLET BY MOUTH EVERY 6 HOURS AS NEEDED, Disp: 360 tablet, Rfl: 0  •  
diphenoxylate-atropine (LOMOTIL) 2.5-0.025 MG per tablet, Take 1 tablet by mouth 4 (Four) Times a Day As Needed for Diarrhea., Disp: 120 tablet, Rfl: 1  •  enoxaparin (Lovenox) 100 MG/ML solution syringe, Inject 1 mL under the skin into the appropriate area as directed Every 12 (Twelve) Hours., Disp: 60 mL, Rfl: 2  •  famotidine (PEPCID) 20 MG tablet, TAKE 1 TABLET BY MOUTH TWICE A DAY (Patient taking differently: Take 20 mg by mouth 2 (Two) Times a Day.), Disp: 180 tablet, Rfl: 1  •  HYDROcodone-acetaminophen (NORCO) 7.5-325 MG per tablet, Take 1 tablet by mouth Every 6 (Six) Hours As Needed for Moderate Pain ., Disp: 240 tablet, Rfl: 0  •  Loratadine (CLARITIN) 10 MG capsule, Take 10 mg by mouth Every Evening., Disp: , Rfl:   •  mirtazapine (REMERON SOL-TAB) 15 MG disintegrating tablet, Place 1 tablet on the tongue Every Night., Disp: 90 tablet, Rfl: 0  •  ondansetron (Zofran) 4 MG tablet, Take 1 tablet by mouth Every 6 (Six) Hours As Needed for Nausea or Vomiting for up to 10 doses., Disp: 10 tablet, Rfl: 1  •  ondansetron (ZOFRAN) 8 MG tablet, Take 1 tablet by mouth 3 (Three) Times a Day As Needed for Nausea or Vomiting., Disp: 60 tablet, Rfl: 5  •  prochlorperazine (COMPAZINE) 10 MG tablet, Take 1 tablet by mouth Every 6 (Six) Hours As Needed for Nausea or Vomiting., Disp: 30 tablet, Rfl: 2  No current facility-administered medications for this visit.     Facility-Administered Medications Ordered in Other Visits:   •  atropine injection 0.25 mg, 0.25 mg, Intravenous, Q15 Min PRN, Dong Nguyen MD PhD  •  dexamethasone (DECADRON) IVPB 12 mg, 12 mg, Intravenous, Once, Dong Nguyen MD PhD  •  fluorouracil (ADRUCIL) 4,850 mg, sodium chloride 0.9 % 97 mL chemo infusion - FOR HOME USE, 2,400 mg/m2 (Treatment Plan Recorded), Intravenous, Once, Dong Nguyen MD PhD  •  FOSAPREPITANT 150 MG/100ML NORMAL SALINE (CBC) IVPB 100 mL 100 mL, 150 mg, Intravenous, Once, Dong Nguyen MD PhD  •  irinotecan 
(CAMPTOSAR) 365 mg in dextrose (D5W) 5 % 518.3 mL chemo IVPB, 180 mg/m2 (Treatment Plan Recorded), Intravenous, Once, Dong Nguyen MD PhD  •  leucovorin 810 mg in dextrose (D5W) 5 % 331 mL IVPB, 400 mg/m2 (Treatment Plan Recorded), Intravenous, Once, Dong Nguyen MD PhD  •  palonosetron (ALOXI) injection 0.25 mg, 0.25 mg, Intravenous, Once, Dong Nguyen MD PhD  •  sodium chloride 0.9 % infusion 250 mL, 250 mL, Intravenous, Once, Dong Nguyen MD PhD    ALLERGIES:   No Known Allergies    SOCIAL HISTORY:       Social History     Tobacco Use   • Smoking status: Former Smoker     Packs/day: 1.00     Years: 24.00     Pack years: 24.00     Types: Electronic Cigarette, Cigarettes   • Smokeless tobacco: Never Used   • Tobacco comment: LAST CIGARETTE 8/12/2020   Substance Use Topics   • Alcohol use: Not Currently   • Drug use: No         FAMILY HISTORY:   Mother has positive factor V Leiden mutation, although she did not have thrombosis, mother also is coronary disease with stenting, she also is occasional bruising.  Maternal grandmother had DVT, she had multiple surgical procedures.  Patient mother's half-brother had metastatic colon cancer at diagnosis in his 50s.  Maternal great aunt has breast cancer.  Patient will follow his skin cancer in his 60s, exclusive.    REVIEW OF SYSTEMS:  Review of Systems   Constitutional: Negative for activity change, appetite change, chills, diaphoresis, fatigue, fever and unexpected weight change.   HENT: Negative for congestion, hearing loss, mouth sores, nosebleeds and trouble swallowing.    Eyes: Negative for photophobia and visual disturbance.   Respiratory: Negative for cough, chest tightness and shortness of breath (This is almost resolved as of 1/5/2020.).    Cardiovascular: Negative for chest pain, palpitations and leg swelling.   Gastrointestinal: Negative for abdominal distention, abdominal pain, anal bleeding, constipation, diarrhea and nausea.   Endocrine: 
"Negative for cold intolerance and heat intolerance.   Genitourinary: Negative for dysuria and hematuria.   Musculoskeletal: Negative for arthralgias, back pain, joint swelling and neck pain.   Skin: Negative for color change, rash and wound.   Allergic/Immunologic: Positive for immunocompromised state (Secondary to adjuvant chemotherapy). Negative for environmental allergies and food allergies.   Neurological: Positive for weakness and numbness (Mild numbness involving fingertips and toes). Negative for dizziness and headaches.   Hematological: Negative for adenopathy. Does not bruise/bleed easily.   Psychiatric/Behavioral: Negative for agitation, confusion, dysphoric mood (Depression under control with medication) and suicidal ideas. The patient is nervous/anxious.               Vitals:    01/05/21 0757   BP: 129/88   Pulse: 119   Resp: 16   Temp: 97.5 °F (36.4 °C)   SpO2: 95%   Weight: 97.7 kg (215 lb 4.8 oz)   Height: 166 cm (65.35\")   PainSc: 0-No pain     Current Status 1/5/2021   ECOG score 0         Physical Exam  Vitals signs reviewed.   Constitutional:       General: She is not in acute distress.     Appearance: Normal appearance. She is well-developed.      Comments: Seated in wheelchair, accompanied by her mom.   HENT:      Head: Normocephalic and atraumatic.      Comments: Facial swelling improved  Eyes:      General: No scleral icterus.     Conjunctiva/sclera: Conjunctivae normal.      Pupils: Pupils are equal, round, and reactive to light.   Neck:      Musculoskeletal: Neck supple.   Cardiovascular:      Rate and Rhythm: Normal rate and regular rhythm.      Heart sounds: Normal heart sounds. No murmur.   Pulmonary:      Effort: Pulmonary effort is normal. No respiratory distress.      Breath sounds: Normal breath sounds. No wheezing, rhonchi or rales.   Chest:      Comments: Benign-appearing right chest Mediport.  Bruising bilaterally on the upper chest.  Abdominal:      General: Bowel sounds are "
normal. There is no distension.      Palpations: Abdomen is soft.   Musculoskeletal: Normal range of motion.   Skin:     General: Skin is warm and dry.      Findings: No rash.      Comments: Abdomen wound has healed completely   Neurological:      Mental Status: She is alert and oriented to person, place, and time.       RECENT LABS:    Lab Results   Component Value Date    WBC 5.22 01/05/2021    HGB 13.5 01/05/2021    HCT 42.2 01/05/2021    MCV 98.6 (H) 01/05/2021     01/05/2021     Lab Results   Component Value Date    NEUTROABS 3.52 01/05/2021     Glucose   Date Value Ref Range Status   01/05/2021 109 74 - 124 mg/dL Final     BUN   Date Value Ref Range Status   01/05/2021 9 6 - 20 mg/dL Final     Creatinine   Date Value Ref Range Status   01/05/2021 0.78 0.60 - 1.10 mg/dL Final   09/09/2020 0.60 0.60 - 1.30 mg/dL Final     Comment:     Serial Number: [id number]:  [id number]     Sodium   Date Value Ref Range Status   01/05/2021 139 134 - 145 mmol/L Final     Potassium   Date Value Ref Range Status   01/05/2021 4.3 3.5 - 4.7 mmol/L Final     Chloride   Date Value Ref Range Status   01/05/2021 104 98 - 107 mmol/L Final     CO2   Date Value Ref Range Status   01/05/2021 24.5 22.0 - 29.0 mmol/L Final     Calcium   Date Value Ref Range Status   01/05/2021 9.3 8.5 - 10.2 mg/dL Final     Total Protein   Date Value Ref Range Status   01/05/2021 7.1 6.3 - 8.0 g/dL Final     Albumin   Date Value Ref Range Status   01/05/2021 3.50 3.50 - 5.20 g/dL Final     ALT (SGPT)   Date Value Ref Range Status   01/05/2021 28 0 - 33 U/L Final     AST (SGOT)   Date Value Ref Range Status   01/05/2021 19 0 - 32 U/L Final     Alkaline Phosphatase   Date Value Ref Range Status   01/05/2021 103 38 - 116 U/L Final     Total Bilirubin   Date Value Ref Range Status   01/05/2021 0.2 0.2 - 1.2 mg/dL Final     eGFR Non  Amer   Date Value Ref Range Status   01/05/2021 81 >60 mL/min/1.73 Final     BUN/Creatinine Ratio   Date Value Ref 
Range Status   2021 11.5 7.3 - 30.0 Final     Anion Gap   Date Value Ref Range Status   2021 10.5 5.0 - 15.0 mmol/L Final     Lab Results   Component Value Date    CEA 1.32 2020     IMAGIN. CT PULMONARY ANGIOGRAM: 2020.      HISTORY:  PE suspected, high probability.     TECHNIQUE:  Non contrast localizing images were performed through the  mediastinum for localization and bolus timing. Axial images were  obtained from the apex to the lung bases and during IV contrast  infusion. No adverse reaction. Multiplanar reformatted images were  reconstructed from the helical source data. Radiation dose reduction  techniques were utilized, including automated exposure control and  exposure modulation based on body size.         COMPARISON:  CT chest 2020 and CTA chest 2020.     FINDINGS:       Main pulmonary artery is noted enlarged. No pulmonary embolus is seen  within the main, left, right, and lobar branches. The segmental and  subsegmental branches cannot be clearly evaluated due to contrast bolus  timing.     The thoracic aorta is normal in caliber. No evidence of thoracic aortic  dissection.      Again seen are multiple left upper chest wall collaterals and thrombosis  of the SVC and brachiocephalic vein .     The heart is normal in size. There is a stable trace pericardial  effusion.     Stable right hilar soft tissue prominence. No mediastinal or left hilar  lymphadenopathy.  No axillary lymphadenopathy.     The central airways are patent. Stable elevated right hemidiaphragm.  Stable small right pleural effusion with associated compressive  atelectasis. Stable 7 mm right upper lobe lung nodule. Stable left  posterior lung base scarring or atelectasis. Stable peripheral patchy  groundglass opacity in the bilateral upper lobes.     No acute osseous abnormality. No aggressive osseous lesions.     Hepatic steatosis.     IMPRESSION:  1.  No evidence of pulmonary embolism within the 
main, right, left and  lobar branches of the pulmonary arterial vasculature. The segmental and  subsegmental branches cannot be clearly evaluated due to contrast bolus  timing.  2.  Stable peripheral groundglass opacities at the bilateral upper lobes  that may represent pneumonia.  3.  Stable small right pleural effusion and bibasilar atelectasis.  4.  Stable thrombosis of the SVC and brachiocephalic vein.   5.  Other stable findings, as above.        Assessment/Plan      ASSESSMENT:   1.  Rectal cancer, rectal ultrasound examination reported T3N0 disease without lymphadenopathy.   · CT scan of chest, abdomen and pelvis reported no lymphadenopathy in the abdomen, pelvis or chest. She does have small pulmonary nodule 6-7 mm and 2 mm with indeterminate feature. There is no solid evidence of metastatic disease otherwise.   · Based on the CT scan and rectal ultrasound imaging studies, this patient had stage IIA (T3N0M0) disease.    · She had PET scan examination on 8/8/2019 which reported a hypermetabolic right upper lobe pulmonary nodule 6 mm with SUV 5.6.  This is suspicious for metastatic disease however it is too small to be biopsied.  This patient may have stage IV disease.   · She initiated concurrent radiation with continuous 5-FU on 8/12/2019.  · Patient finished concurrent chemoradiation therapy.  · Patient underwent surgical resection of the rectal tumor and diverting loop ileostomy on 12/2/2019 with Dr. Ye.  Pathology evaluation reported residual T3 disease, with 1 out of 14 lymph nodes positive for malignancy.  Certainly this patient has at least stage IIIb rectal cancer (T3 N1 M0?)  · On 1/22/2020 adjuvant chemotherapy FOLFOX 6 regimen initiated.    · On 2/5/2022 cycle #2 was delayed secondary to neutropenia.  She was given 3 days of Granix.   · Emend added with cycle 3.  With improved nausea control  · Continuing home Granix x3 days following 5-FU disconnect  · 3/11/2020 due for cycle 4, however, she is 
experiencing progressive abdominal pain and occasional fevers.   · CT scan performed on 3/13/2020 reveals no evidence of progressive or recurrent disease.  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  · Patient hospitalized 4/24-4/26/20 after cycle 5 of chemotherapy with acute UTI.  CT abdomen/pelvis noting fluid collection in the presacral region having diminished in size compared to CT dated 3/13/2020.  Patient was evaluated by Dr. Ye while in the hospital with further plans to evaluate possible colovaginal fistula following completion of chemotherapy.  Patient did respond to IV antibiotics and discharged home on oral cefdinir.  · 4/29/2020 cycle 6 of FOLFOX.  Urinary symptoms have resolved   · Patient seen in the [facility] ED on 5/2/2020 for what was suspected to be an allergic reaction.  She called our service on Saturday explaining that she was experiencing hand and face swelling.  She was instructed to proceed to the emergency department at which time she was assessed and prescribed a Medrol Dosepak.  She had just completed her 5-FU and was unhooked on Friday, 5/1/2020.  Her symptoms have resolved in the office on assessment, 5/5/2020.  · The patient had grade 3 hand-foot syndrome based on symptomology.  Patient had cycle #8 of 5-FU on 5/13/2020. Due to her symptoms and poor tolerance to the 5-FU, her treatment dose will be reduced 50% for oxaliplatin and infusional 5-FU.  Bolus 5-FU will be discontinued..  · On 5/13/2020  We discussed further treatment options pending the scan results.  If she has indeed residual disease or metastatic disease, we will switch her to irinotecan plus Avastin or anti-EGFR monoclonal antibody.  She will need a further molecular testing of her tumor samples in that case.  · On 5/21/2020 patient had a PET scan and it showed no evidence of of metastatic disease in the neck, chest, abdomen or pelvis.  There was fluid accumulation/abscess.   · On 5/27/2020 I discussed with the 
patient that we can discontinue chemotherapy at this time.  She will follow-up with Dr. Ye to discuss a possibility to reverse the ileostomy.  We likely will obtain anther PET scan in 3 to 4 months for reassessment.    · Patient was seen by Dr. Ye on 6/19/2019 for evaluation and to discuss possible take down of her ileostomy.  She is scheduled to have a gastrografin enema on 7/2/2020 to evaluate for a fistula, and then a colonoscopy on 7/15/2020, both done by Dr. Ye.  She states that based on the above imaging and procedure findings, she may have a more extensive surgery done or just have her ileostomy reversed, which would likely be done in August 2020.  This will all be coordinated under the care of Dr. Ye.     · I reviewed scan results with the patient on 9/16/2020 for the most recent CT scan from 09/09/2020 and also compared it to her previous PET scan examination from 05/21/2020 and also the original PET scan from 08/09/2019.  The original PET scan there is a small right upper lobe pulmonary nodule 6 mm with SUV 5.6. So in the 05/2020 PET scan the nodule is still there but activity seems much less and this most recent CT scan examination also documented the preexisting small pulmonary nodule however there is a new left lower lobe pulmonary nodule 8 mm in size and I shared with the patient today this nodule is suspicious for metastatic disease. Laboratory studies reported minimal CEA level 1.32 on 09/09/2020. Liver function panel was only marginally abnormal with the ALT 45, the remaining is normal. However laboratory study today showed worsening AST 55, ALT 95 and alkaline phosphatase 143 but is still normal, total bilirubin 0.3.   · So I discussed with the patient on 9/16/2020 recommended to have repeat PET scan examination for assessment because the left lower lobe pulmonary nodule is too small to be biopsied, and if the PET scan reports hypermetabolic lesion, I recommended to have stereotactic 
radiation therapy. Explained to patient that she is not a really good candidate to have thoracotomy for resection because she still has unhealed wound in the abdomen. I think the stereotactic radiation therapy will be a more feasible choice.  · Laboratory study on 9/16/2020 reported worsening liver function panel with both elevated AST, ALT and also slightly worsened alkaline phosphatase despite having normal total bilirubin. I recommended to repeat laboratory study for reevaluation. The only new medication she has in the past several days is Augmentin but otherwise no change of condition. She denies any recent viral infection. We will monitor this.   · PET scan examination obtained on 9/18/2020 showed metastatic disease.  It reported a few hypermetabolic pulmonary nodules, and some new small pulmonary nodules, all of them highly suspicious for metastatic disease.  She also has hypermetabolic activity in the abdomen and pelvic area which could be related to scar tissue from her recent surgery versus metastatic disease.  Nevertheless overall picture fits with metastatic cancer.  · I discussed with the patient on 9/20/2020, we reviewed the PET scan images together, and I recommended to have systematic chemotherapy, I do not think stereotactic reading therapy to the hypermetabolic lesions in the lungs warranted at this time because even those will be treated with reading therapy, she will still need systematic chemotherapy.  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  I did discuss with the patient using anti-EGFR monoclonal antibody versus anti-VEGF monoclonal antibody.     · Palliative chemotherapy cycle#1 FOLFIRI was started on 10/13/2020.  No bolus 5-FU given considering previous poor tolerance.  Genetic studies still pending.   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  Microsatellite stable, mutation burden 5/Mb.    · Discussed with the patient 
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  She could be a candidate for anti-VEGF monoclonal antibody, however because of active wound, she is not a candidate right now at this moment.  · Patient seen in the ED for acute right neck pain on 11/16/2020.  CT angiogram as well as CT of the cervical spine performed with no acute findings but notably one of her pulmonary nodules, left upper lobe, somewhat decreased in size.  Patient given prednisone pack.  Further details below.  · Patient due today for cycle #4 FOLFIRI.  Plan repeat PET scan after cycle 6.  · Last received FOLFIRI on 12/8/2020, cycle 5.   · Patient here 12/29/2020 for evaluation and consideration of cycle 6, but she continues to complain of swelling.  She does have swelling typically after treatment as well.  We will hold treatment for 1 week and reevaluate next week.  Still planning on PET scan following cycle 6.   · Cycle #6 chemotherapy will be resumed on 1/5/2021.  Oral antibiotic irinotecan.    2 .  Pulmonary nodule, hypermetabolic on PET scan.   · Her original PET scan examination from 8/8/2019 reported a small 8 mm right upper apex pulmonary nodule but hypermetabolic SUV 5.1.  That was too small to be biopsied, however there was always a possibility of metastatic disease considering that high activity despite a such a small nodule.  · Her chest CT scan examination from 3/13/2020 reported this shrank to 6 mm.    · PET scan examination on 5/21/2020 reported no metabolic activity at this residual nodule.  This needs to be monitored.    · PET scan examination 9/18/2020 reported couple of hypermetabolic pulmonary nodules, besides a few extra small pulmonary nodules.  Those are highly suspicious for metastatic disease.      3.   *Factor V Leiden heterozygosity with history of pulmonary embolism in September 2019 and chronic left innominate vein thrombosis along with 
acute right subclavian and SVC thrombus 12/21/2020  · Patient is known factor V Leiden heterozygote  · Patient had been receiving low-dose Lovenox 40 mg daily as prophylaxis due to presence of MediPort and underlying malignancy when she developed pulmonary emboli 9/20/2019.  Low-dose Lovenox discontinued and initiated Xarelto 20 mg daily.  · Patient with apparent understanding chronic thrombosis of left innominate vein likely associated with MediPort, was evident per vascular surgery from CT scan in September 2020.  · Patient held Xarelto for 2 days prior to MediPort removal from the left chest wall and placement of new port in the right chest wall on 12/18/2020.  She resumed Xarelto that evening.  · Progressive swelling in the neck, face, upper extremities prompting current hospitalization with CT scan showing thrombosis in the right subclavian vein and SVC.  · Patient with port associated thrombosis in the setting of factor V Leiden heterozygosity and active metastatic malignancy.  Although she had been off of Xarelto for a few days, clearly Xarelto was not prohibiting progression of her thrombosis after she resumed treatment and there was some evidence to suggest thrombosis had been present at least in the innominate vein on prior scan in September but now appears chronic.  Current acute thrombosis involving SVC and right subclavian.  · Patient was admitted and placed on heparin drip  · Patient was evaluated by vascular surgery and intervention was not recommended for thrombolysis/thrombectomy.  · On 12/22/2020, the patient developed worsening shortness of breath, increasing upper extremity edema consistent with worsening SVC syndrome.  Repeat CT angiogram chest was performed early on 12/23/2020 with findings of stable SVC and brachiocephalic vein thrombosis, no evidence of pulmonary embolism.  · Symptoms improved and patient was discharged 12/24/2020 on Lovenox 100 mg every 12 hours.    · Returns 12/29/2020 for 
evaluation continuing Lovenox, overall tolerating it well.  No bleeding issues.  · Improved edema 1/5/2021.  Will continue Lovenox weight-based every 12 hours.    4.  This patient also has strong family history for malignancy the patient had appointment with genetic counseling on September 3, 2019.  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    5.  Mild anemia, improved since her surgery.    · She also has a history of iron deficiency.  Iron studies reveal a saturation of just 10%.  She was started on oral iron but was unable to tolerate due to GI side effects.   · Status post Injectafer 2/5/2020 and 2/12/2020   · Improved hemoglobin 13.5 on 1/5/2021.    6.  Peripheral neuropathy secondary to chemotherapy.    · This is mild involving bilateral hands and feet as reported on 9/16/2020.   · Will avoid chemotherapy with oxaliplatin.  · Stable mild neuropathy as of 11/10/2020  · Patient reports worsening peripheral neuropathy and cycle #5 chemotherapy on 12/8/2020 with and without irinotecan.   · On 1/5/2021, patient reports improvement of peripheral neuropathy, will add irinotecan back to chemotherapy.  Continue to monitor and adjust medication.    7.  History of hand-foot syndrome grade 3.    · It become so significant after cycle #7 FOLFOX treatment.  Discussed with patient on 5/13/2020, will discontinue the bolus 5-FU dose, and also decrease 50% of the infusion dose through the pump.   · We also use Medrol Dosepak for possible recurrent symptomology.  She responded this well.   · Her hand-foot syndrome improved.  · Under current treatment with FOLFIRI, patient not receiving 5-FU bolus.  No recurrent issues.    8.  Hyperlacrimation and mild scleral irritation related to 5-FU.  She has been taking steroid eyedrops.  This has improved her hyperlacrimation.  · Not currently an issue.  Continue to monitor with 5-FU.      9.  Abnormal liver function panel.  · Improved liver function panel 9/18/2020.  This is probably 
related to her recent infection.  · She has normalized AST, alk phosphatase, and a much improved only marginally elevated ALT 35 on 10/13/2020.    · Normalized liver function panel on 10/27/2020.    · Normal liver panel on 11/10/2020.    · Normal liver function panel on 12/5/2021.  Continue to monitor.    10.  Intermittent leukocytopenia secondary to chemotherapy.   · WBC currently normal at 5220 and the neutrophil 3520 on 1/5/2021.  Continue to monitor.  Consider G-CSF if neutropenia becomes an ongoing issue.      11.  Depression.  Patient seen by JULIET Davenport on 11/9/2020.  She was started on mirtazapine.  Lexapro was discontinued.  This has definitely improved her mood with the patient feeling overall much better.  She is sleeping better.  Appetite is improved with her actually eating more than she wants to.  She plans to continue follow-up with supportive oncology.    12.  Right neck/shoulder pain occurring twice now.  Patient reports that this occurred with both cycle #2 and cycle #3.  She did not mention it with cycle #2.  She did mention it was cycle #3 and per review of the EMR nursing note it was during irinotecan infusion.  Pain then subsided.  However it returned 5 days later with the patient going to the ED on 11/16/2020.  As imaging details are noted above, no acute findings were found.  Etiology remains unclear.  Patient's Mediport is in the opposite side of her chest.  She was given a prednisone taper which she continues at this time.  I do still wonder if she is having some kind of weird reaction to irinotecan.  I did encourage the patient to speak up if she has recurrent pain during treatment today.  She verbalized understanding.    13.  Intermittent upper abdominal discomfort.  This improves with eating.  After further discussion with the patient she also notes 3 nights of increased reflux when laying down.  We discussed her symptoms could be related to increase stomach acid or potential 
ulcer.  She is currently taking Pepcid 20 mg daily.  I instructed her to add Prilosec 20 mg daily.         PLAN:  1. Resume cycle #6 chemotherapy today, with infusional 5-FU and irinotecan.  2. Continue Lovenox 100 mg SQ every 12 hours.   3. Arrange PET scan examination in 1 week for reassessment.  4. Return in 2 weeks for follow-up visit with Dr. Nguyen for reevaluation of PET scan results and the discussion of further management plan.  Patient has favorable cost, will continue cycle 7 FOLFIRI.    5. Consider possibly addition of Avastin from cycle #7.        Dong Nguyen MD PhD  01/05/21        CC:  Deepika Vela III NP-C   MD Perla Bowers MD